# Patient Record
Sex: MALE | Race: WHITE | Employment: OTHER | URBAN - METROPOLITAN AREA
[De-identification: names, ages, dates, MRNs, and addresses within clinical notes are randomized per-mention and may not be internally consistent; named-entity substitution may affect disease eponyms.]

---

## 2017-01-07 ENCOUNTER — LAB ENCOUNTER (OUTPATIENT)
Dept: LAB | Facility: HOSPITAL | Age: 82
End: 2017-01-07
Attending: UROLOGY
Payer: MEDICARE

## 2017-01-07 DIAGNOSIS — R82.90 ABNORMAL URINALYSIS: ICD-10-CM

## 2017-01-07 LAB
BILIRUB UR QL STRIP.AUTO: NEGATIVE
CLARITY UR REFRACT.AUTO: CLEAR
COLOR UR AUTO: YELLOW
GLUCOSE UR STRIP.AUTO-MCNC: NEGATIVE MG/DL
KETONES UR STRIP.AUTO-MCNC: NEGATIVE MG/DL
NITRITE UR QL STRIP.AUTO: NEGATIVE
PH UR STRIP.AUTO: 7 [PH] (ref 4.5–8)
PROT UR STRIP.AUTO-MCNC: NEGATIVE MG/DL
SP GR UR STRIP.AUTO: 1.01 (ref 1–1.03)
UROBILINOGEN UR STRIP.AUTO-MCNC: 0.2 MG/DL

## 2017-01-07 PROCEDURE — 81001 URINALYSIS AUTO W/SCOPE: CPT

## 2017-01-30 ENCOUNTER — APPOINTMENT (OUTPATIENT)
Dept: LAB | Facility: HOSPITAL | Age: 82
End: 2017-01-30
Attending: UROLOGY
Payer: MEDICARE

## 2017-01-30 DIAGNOSIS — N40.2 PROSTATE NODULE: ICD-10-CM

## 2017-01-30 DIAGNOSIS — N13.8 HYPERTROPHY OF PROSTATE WITH URINARY OBSTRUCTION AND OTHER LOWER URINARY TRACT SYMPTOMS (LUTS): ICD-10-CM

## 2017-01-30 DIAGNOSIS — N40.1 HYPERTROPHY OF PROSTATE WITH URINARY OBSTRUCTION AND OTHER LOWER URINARY TRACT SYMPTOMS (LUTS): ICD-10-CM

## 2017-01-30 LAB — PSA SERPL-MCNC: 8.33 NG/ML (ref 0.01–4)

## 2017-01-30 PROCEDURE — 36415 COLL VENOUS BLD VENIPUNCTURE: CPT

## 2017-01-30 PROCEDURE — 84153 ASSAY OF PSA TOTAL: CPT

## 2017-03-13 PROBLEM — N40.0 BPH WITH ELEVATED PSA: Status: ACTIVE | Noted: 2017-03-13

## 2017-03-13 PROBLEM — R97.20 BPH WITH ELEVATED PSA: Status: ACTIVE | Noted: 2017-03-13

## 2017-08-24 ENCOUNTER — HOSPITAL ENCOUNTER (OUTPATIENT)
Dept: GENERAL RADIOLOGY | Facility: HOSPITAL | Age: 82
Discharge: HOME | End: 2017-08-24
Attending: FAMILY MEDICINE
Payer: MEDICARE

## 2017-08-24 DIAGNOSIS — M25.561 PAIN IN RIGHT KNEE: ICD-10-CM

## 2017-08-24 PROCEDURE — 73560 X-RAY EXAM OF KNEE 1 OR 2: CPT | Performed by: FAMILY MEDICINE

## 2017-08-24 PROCEDURE — 73502 X-RAY EXAM HIP UNI 2-3 VIEWS: CPT | Performed by: FAMILY MEDICINE

## 2017-11-16 ENCOUNTER — PRIOR ORIGINAL RECORDS (OUTPATIENT)
Dept: OTHER | Age: 82
End: 2017-11-16

## 2017-12-07 ENCOUNTER — HOSPITAL ENCOUNTER (OUTPATIENT)
Dept: GENERAL RADIOLOGY | Facility: HOSPITAL | Age: 82
Discharge: HOME | End: 2017-12-07
Attending: FAMILY MEDICINE
Payer: MEDICARE

## 2017-12-07 ENCOUNTER — PRIOR ORIGINAL RECORDS (OUTPATIENT)
Dept: OTHER | Age: 82
End: 2017-12-07

## 2017-12-07 DIAGNOSIS — M25.562 PAIN OF LEFT PATELLA: ICD-10-CM

## 2017-12-07 DIAGNOSIS — M25.562 LEFT KNEE PAIN: ICD-10-CM

## 2017-12-07 PROCEDURE — 73560 X-RAY EXAM OF KNEE 1 OR 2: CPT | Performed by: FAMILY MEDICINE

## 2018-01-03 LAB
ALBUMIN: 3.9 G/DL
ALKALINE PHOSPHATATE(ALK PHOS): 79 IU/L
BILIRUBIN TOTAL: 0.4 MG/DL
BUN: 19 MG/DL
CALCIUM: 9.1 MG/DL
CHLORIDE: 104 MEQ/L
CHOLESTEROL, TOTAL: 175 MG/DL
CREATININE, SERUM: 1.1 MG/DL
FREE T4: 1.49 MG/DL
GLUCOSE: 97 MG/DL
HDL CHOLESTEROL: 50 MG/DL
HEMATOCRIT: 37.4 %
HEMOGLOBIN A1C: 5.5 %
HEMOGLOBIN: 12.3 G/DL
LDL CHOLESTEROL: 108 MG/DL
PLATELETS: 223 K/UL
POTASSIUM, SERUM: 4.5 MEQ/L
PROTEIN, TOTAL: 6.3 G/DL
RED BLOOD COUNT: 3.9 X 10-6/U
SGOT (AST): 19 IU/L
SGPT (ALT): 15 IU/L
SODIUM: 142 MEQ/L
THYROID STIMULATING HORMONE: 0.42 MLU/L
TRIGLYCERIDES: 83 MG/DL
URIC ACID: 6.5 MG/DL
WHITE BLOOD COUNT: 3.4 X 10-3/U

## 2018-07-03 ENCOUNTER — APPOINTMENT (OUTPATIENT)
Dept: LAB | Facility: HOSPITAL | Age: 83
End: 2018-07-03
Attending: UROLOGY
Payer: MEDICARE

## 2018-07-03 DIAGNOSIS — R97.20 BPH WITH ELEVATED PSA: ICD-10-CM

## 2018-07-03 DIAGNOSIS — N40.0 BPH WITH ELEVATED PSA: ICD-10-CM

## 2018-07-03 LAB — PSA SERPL-MCNC: 3.89 NG/ML (ref 0.01–4)

## 2018-07-03 PROCEDURE — 84153 ASSAY OF PSA TOTAL: CPT

## 2018-07-03 PROCEDURE — 36415 COLL VENOUS BLD VENIPUNCTURE: CPT

## 2018-10-23 ENCOUNTER — PRIOR ORIGINAL RECORDS (OUTPATIENT)
Dept: OTHER | Age: 83
End: 2018-10-23

## 2018-10-23 ENCOUNTER — MYAURORA ACCOUNT LINK (OUTPATIENT)
Dept: OTHER | Age: 83
End: 2018-10-23

## 2018-10-26 ENCOUNTER — HOSPITAL ENCOUNTER (OUTPATIENT)
Dept: CARDIOLOGY CLINIC | Facility: HOSPITAL | Age: 83
Discharge: HOME | End: 2018-10-26
Attending: INTERNAL MEDICINE

## 2018-10-26 ENCOUNTER — MYAURORA ACCOUNT LINK (OUTPATIENT)
Dept: OTHER | Age: 83
End: 2018-10-26

## 2018-10-26 DIAGNOSIS — I65.23 BILATERAL CAROTID ARTERY STENOSIS: ICD-10-CM

## 2018-10-26 DIAGNOSIS — E78.00 PURE HYPERCHOLESTEROLEMIA: ICD-10-CM

## 2018-10-26 DIAGNOSIS — I25.10 CORONARY ARTERIOSCLEROSIS: ICD-10-CM

## 2018-11-05 ENCOUNTER — PRIOR ORIGINAL RECORDS (OUTPATIENT)
Dept: OTHER | Age: 83
End: 2018-11-05

## 2018-11-26 ENCOUNTER — PRIOR ORIGINAL RECORDS (OUTPATIENT)
Dept: OTHER | Age: 83
End: 2018-11-26

## 2018-12-11 ENCOUNTER — PRIOR ORIGINAL RECORDS (OUTPATIENT)
Dept: OTHER | Age: 83
End: 2018-12-11

## 2019-01-02 LAB
ALBUMIN: 3.6 G/DL
ALKALINE PHOSPHATATE(ALK PHOS): 79 IU/L
BILIRUBIN TOTAL: 0.5 MG/DL
BUN: 20 MG/DL
CALCIUM: 9 MG/DL
CHLORIDE: 105 MEQ/L
CHOLESTEROL, TOTAL: 166 MG/DL
CREATININE, SERUM: 1.1 MG/DL
GLUCOSE: 104 MG/DL
HDL CHOLESTEROL: 51 MG/DL
HEMATOCRIT: 38.6 %
HEMOGLOBIN A1C: 5.7 %
HEMOGLOBIN: 12.7 G/DL
LDL CHOLESTEROL: 101 MG/DL
PLATELETS: 163 K/UL
POTASSIUM, SERUM: 4.4 MEQ/L
PROTEIN, TOTAL: 6.1 G/DL
RED BLOOD COUNT: 4.1 X 10-6/U
SGOT (AST): 19 IU/L
SGPT (ALT): 16 IU/L
SODIUM: 141 MEQ/L
THYROID STIMULATING HORMONE: 0.1 MLU/L
TRIGLYCERIDES: 71 MG/DL
WHITE BLOOD COUNT: 3.1 X 10-3/U

## 2019-02-28 VITALS
WEIGHT: 171 LBS | BODY MASS INDEX: 25.33 KG/M2 | HEART RATE: 66 BPM | SYSTOLIC BLOOD PRESSURE: 122 MMHG | HEIGHT: 69 IN | DIASTOLIC BLOOD PRESSURE: 60 MMHG

## 2019-10-15 ENCOUNTER — HOSPITAL ENCOUNTER (EMERGENCY)
Facility: HOSPITAL | Age: 84
Discharge: HOME | End: 2019-10-15
Attending: EMERGENCY MEDICINE
Payer: MEDICARE

## 2019-10-15 ENCOUNTER — APPOINTMENT (OUTPATIENT)
Dept: ULTRASOUND IMAGING | Facility: HOSPITAL | Age: 84
End: 2019-10-15
Attending: EMERGENCY MEDICINE
Payer: MEDICARE

## 2019-10-15 ENCOUNTER — APPOINTMENT (OUTPATIENT)
Dept: GENERAL RADIOLOGY | Facility: HOSPITAL | Age: 84
End: 2019-10-15
Attending: EMERGENCY MEDICINE
Payer: MEDICARE

## 2019-10-15 VITALS
OXYGEN SATURATION: 96 % | RESPIRATION RATE: 18 BRPM | HEART RATE: 75 BPM | DIASTOLIC BLOOD PRESSURE: 61 MMHG | SYSTOLIC BLOOD PRESSURE: 121 MMHG | TEMPERATURE: 98 F

## 2019-10-15 DIAGNOSIS — M25.522 LEFT ELBOW PAIN: Primary | ICD-10-CM

## 2019-10-15 PROCEDURE — 99284 EMERGENCY DEPT VISIT MOD MDM: CPT

## 2019-10-15 PROCEDURE — 73060 X-RAY EXAM OF HUMERUS: CPT | Performed by: EMERGENCY MEDICINE

## 2019-10-15 PROCEDURE — 93971 EXTREMITY STUDY: CPT | Performed by: EMERGENCY MEDICINE

## 2019-10-15 PROCEDURE — 73080 X-RAY EXAM OF ELBOW: CPT | Performed by: EMERGENCY MEDICINE

## 2019-10-15 NOTE — ED PROVIDER NOTES
Patient Seen in: [facility] Emergency Department      History   Patient presents with:  Upper Extremity Injury (musculoskeletal)    Stated Complaint: L elbow/humerus pain, worse with movement    HPI    Patient is a 80year-old right-hand-dominant ma tenderness to palpation appreciated. There is no JVD. No meningeal signs or nuchal rigidity appreciated. No stridor. LUNGS: Clear to auscultation bilaterally with no wheeze. There is good equal air entry bilaterally. HEART: Regular rate and rhythm.  Carlos Hamilton Peter Velasquez MD on 10/15/2019 at 12:14     Approved by: Savana Del Real MD on 10/15/2019 at 12:15                MDM     Patient had x-rays done as noted above. Patient does have evidence of an elbow joint effusion. There is no fracture appreciated.   There is no

## 2019-10-15 NOTE — ED NOTES
Pt awake and alert, skin w/d,resps reg/unlabored. Pt appears comfortable on cart. Pt aware of plan of care.

## 2019-12-03 LAB
25(OH)D3+25(OH)D2 SERPL-MCNC: 42 NG/ML
ALBUMIN SERPL-MCNC: 4 G/DL
ALP SERPL-CCNC: 83 U/L
ALT SERPL-CCNC: 17 U/L
AST SERPL-CCNC: 24 U/L
BILIRUB SERPL-MCNC: 0.4 MG/DL
BUN SERPL-MCNC: 21 MG/DL
CALCIUM SERPL-MCNC: 9.5 MG/DL
CHLORIDE SERPL-SCNC: 107 MMOL/L
CHOLEST SERPL-MCNC: 176 MG/DL
CREAT SERPL-MCNC: 1 MG/DL
GLUCOSE SERPL-MCNC: 97 MG/DL
HBA1C MFR BLD: 5.7 %
HCT VFR BLD CALC: 39.5 %
HDLC SERPL-MCNC: 53 MG/DL
HGB BLD-MCNC: 12.7 G/DL
LDLC SERPL CALC-MCNC: 108 MG/DL
NONHDLC SERPL-MCNC: 123 MG/DL
PLATELET # BLD: 195 10*3/UL
POTASSIUM SERPL-SCNC: 3.9 MMOL/L
PROT SERPL-MCNC: 6.4 G/DL
RBC # BLD: 4 10*6/UL
SODIUM SERPL-SCNC: 143 MMOL/L
T4 FREE SERPL-MCNC: 1.71 NG/DL
TRIGL SERPL-MCNC: 73 MG/DL
TSH SERPL-ACNC: 0.05 M[IU]/L
URATE SERPL-MCNC: 5.6 MG/DL
WBC # BLD: 4 10*3/UL

## 2020-01-01 ENCOUNTER — EXTERNAL RECORD (OUTPATIENT)
Dept: HEALTH INFORMATION MANAGEMENT | Facility: OTHER | Age: 85
End: 2020-01-01

## 2020-03-10 ENCOUNTER — OFFICE VISIT (OUTPATIENT)
Dept: CARDIOLOGY | Age: 85
End: 2020-03-10

## 2020-03-10 VITALS
HEART RATE: 72 BPM | BODY MASS INDEX: 24.59 KG/M2 | HEIGHT: 69 IN | SYSTOLIC BLOOD PRESSURE: 104 MMHG | DIASTOLIC BLOOD PRESSURE: 62 MMHG | WEIGHT: 166 LBS

## 2020-03-10 DIAGNOSIS — I25.10 CORONARY ARTERY DISEASE INVOLVING NATIVE CORONARY ARTERY OF NATIVE HEART WITHOUT ANGINA PECTORIS: Primary | ICD-10-CM

## 2020-03-10 DIAGNOSIS — I25.10 CORONARY ARTERY CALCIFICATION SEEN ON CT SCAN: ICD-10-CM

## 2020-03-10 DIAGNOSIS — Z82.49 FAMILY HISTORY OF CARDIOVASCULAR DISEASE: ICD-10-CM

## 2020-03-10 DIAGNOSIS — E78.00 PURE HYPERCHOLESTEROLEMIA: ICD-10-CM

## 2020-03-10 PROCEDURE — 99214 OFFICE O/P EST MOD 30 MIN: CPT | Performed by: INTERNAL MEDICINE

## 2020-03-10 RX ORDER — IBUPROFEN 200 MG
TABLET ORAL EVERY 6 HOURS SCHEDULED
COMMUNITY
End: 2020-08-01 | Stop reason: ALTCHOICE

## 2020-03-10 RX ORDER — ACETAZOLAMIDE 125 MG/1
TABLET ORAL
COMMUNITY

## 2020-03-10 RX ORDER — LORAZEPAM 0.5 MG/1
TABLET ORAL
COMMUNITY

## 2020-03-10 RX ORDER — SENNOSIDES 8.6 MG
650 CAPSULE ORAL DAILY PRN
COMMUNITY

## 2020-03-10 SDOH — SOCIAL STABILITY: SOCIAL NETWORK: ARE YOU MARRIED, WIDOWED, DIVORCED, SEPARATED, NEVER MARRIED, OR LIVING WITH A PARTNER?: WIDOWED

## 2020-03-10 SDOH — HEALTH STABILITY: PHYSICAL HEALTH: ON AVERAGE, HOW MANY DAYS PER WEEK DO YOU ENGAGE IN MODERATE TO STRENUOUS EXERCISE (LIKE A BRISK WALK)?: 3 DAYS

## 2020-03-10 SDOH — HEALTH STABILITY: PHYSICAL HEALTH: ON AVERAGE, HOW MANY MINUTES DO YOU ENGAGE IN EXERCISE AT THIS LEVEL?: 50 MIN

## 2020-03-10 ASSESSMENT — PATIENT HEALTH QUESTIONNAIRE - PHQ9
1. LITTLE INTEREST OR PLEASURE IN DOING THINGS: NOT AT ALL
SUM OF ALL RESPONSES TO PHQ9 QUESTIONS 1 AND 2: 0
2. FEELING DOWN, DEPRESSED OR HOPELESS: NOT AT ALL
SUM OF ALL RESPONSES TO PHQ9 QUESTIONS 1 AND 2: 0

## 2020-05-22 ENCOUNTER — CLINICAL ABSTRACT (OUTPATIENT)
Dept: CARDIOLOGY | Age: 85
End: 2020-05-22

## 2020-05-24 ENCOUNTER — HOSPITAL ENCOUNTER (EMERGENCY)
Facility: HOSPITAL | Age: 85
Discharge: HOME | End: 2020-05-24
Attending: EMERGENCY MEDICINE
Payer: MEDICARE

## 2020-05-24 VITALS
HEART RATE: 70 BPM | RESPIRATION RATE: 18 BRPM | OXYGEN SATURATION: 97 % | SYSTOLIC BLOOD PRESSURE: 115 MMHG | BODY MASS INDEX: 25.43 KG/M2 | TEMPERATURE: 98 F | HEIGHT: 67 IN | WEIGHT: 162 LBS | DIASTOLIC BLOOD PRESSURE: 71 MMHG

## 2020-05-24 DIAGNOSIS — T14.8XXA MULTIPLE SKIN TEARS: Primary | ICD-10-CM

## 2020-05-24 PROCEDURE — 99281 EMR DPT VST MAYX REQ PHY/QHP: CPT

## 2020-05-25 NOTE — ED PROVIDER NOTES
Patient Seen in: [facility] Emergency Department      History   Patient presents with:  Laceration Abrasion    Stated Complaint: laceration left forearm    HPI    Patient is an 80-year-old male presents with injury to left forearm.   He got scraped a pallor  Neuro: No focal deficits. Extremities: Left forearm: Multiple areas of skin tears. ED Course   Labs Reviewed - No data to display       Couple skin tears to left forearm. .  Unable to suture given the thinness of the skin.   Suggested soap water

## 2020-08-07 ENCOUNTER — APPOINTMENT (OUTPATIENT)
Dept: CV DIAGNOSTICS | Facility: HOSPITAL | Age: 85
End: 2020-08-07
Attending: EMERGENCY MEDICINE
Payer: MEDICARE

## 2020-08-07 ENCOUNTER — APPOINTMENT (OUTPATIENT)
Dept: GENERAL RADIOLOGY | Facility: HOSPITAL | Age: 85
End: 2020-08-07
Attending: EMERGENCY MEDICINE
Payer: MEDICARE

## 2020-08-07 ENCOUNTER — HOSPITAL ENCOUNTER (OUTPATIENT)
Facility: HOSPITAL | Age: 85
Setting detail: OBSERVATION
Discharge: HOME | End: 2020-08-10
Attending: EMERGENCY MEDICINE | Admitting: HOSPITALIST
Payer: MEDICARE

## 2020-08-07 DIAGNOSIS — I50.9 ACUTE CONGESTIVE HEART FAILURE, UNSPECIFIED HEART FAILURE TYPE (HCC): ICD-10-CM

## 2020-08-07 DIAGNOSIS — D64.9 ANEMIA, UNSPECIFIED TYPE: ICD-10-CM

## 2020-08-07 DIAGNOSIS — R01.1 HEART MURMUR: ICD-10-CM

## 2020-08-07 DIAGNOSIS — D69.6 THROMBOCYTOPENIA (HCC): ICD-10-CM

## 2020-08-07 DIAGNOSIS — R07.9 ACUTE CHEST PAIN: Primary | ICD-10-CM

## 2020-08-07 LAB
ALBUMIN SERPL-MCNC: 3.2 G/DL (ref 3.4–5)
ALBUMIN/GLOB SERPL: 0.9 {RATIO} (ref 1–2)
ALP LIVER SERPL-CCNC: 77 U/L (ref 45–117)
ALT SERPL-CCNC: 19 U/L (ref 16–61)
ANION GAP SERPL CALC-SCNC: 6 MMOL/L (ref 0–18)
AST SERPL-CCNC: 15 U/L (ref 15–37)
ATRIAL RATE: 92 BPM
BASOPHILS # BLD AUTO: 0.02 X10(3) UL (ref 0–0.2)
BASOPHILS NFR BLD AUTO: 0.3 %
BILIRUB SERPL-MCNC: 0.7 MG/DL (ref 0.1–2)
BILIRUB UR QL STRIP.AUTO: NEGATIVE
BUN BLD-MCNC: 24 MG/DL (ref 7–18)
BUN/CREAT SERPL: 18.9 (ref 10–20)
CALCIUM BLD-MCNC: 9 MG/DL (ref 8.5–10.1)
CHLORIDE SERPL-SCNC: 109 MMOL/L (ref 98–112)
CO2 SERPL-SCNC: 23 MMOL/L (ref 21–32)
COLOR UR AUTO: YELLOW
CREAT BLD-MCNC: 1.27 MG/DL (ref 0.7–1.3)
D-DIMER: 0.62 UG/ML FEU (ref ?–0.87)
DEPRECATED RDW RBC AUTO: 44.2 FL (ref 35.1–46.3)
EOSINOPHIL # BLD AUTO: 0 X10(3) UL (ref 0–0.7)
EOSINOPHIL NFR BLD AUTO: 0 %
ERYTHROCYTE [DISTWIDTH] IN BLOOD BY AUTOMATED COUNT: 12.6 % (ref 11–15)
EST. AVERAGE GLUCOSE BLD GHB EST-MCNC: 117 MG/DL (ref 68–126)
GLOBULIN PLAS-MCNC: 3.5 G/DL (ref 2.8–4.4)
GLUCOSE BLD-MCNC: 197 MG/DL (ref 70–99)
GLUCOSE UR STRIP.AUTO-MCNC: NEGATIVE MG/DL
HBA1C MFR BLD HPLC: 5.7 % (ref ?–5.7)
HCT VFR BLD AUTO: 37.5 % (ref 39–53)
HGB BLD-MCNC: 12.3 G/DL (ref 13–17.5)
IMM GRANULOCYTES # BLD AUTO: 0.03 X10(3) UL (ref 0–1)
IMM GRANULOCYTES NFR BLD: 0.4 %
KETONES UR STRIP.AUTO-MCNC: NEGATIVE MG/DL
LYMPHOCYTES # BLD AUTO: 0.42 X10(3) UL (ref 1–4)
LYMPHOCYTES NFR BLD AUTO: 5.8 %
M PROTEIN MFR SERPL ELPH: 6.7 G/DL (ref 6.4–8.2)
MCH RBC QN AUTO: 31.2 PG (ref 26–34)
MCHC RBC AUTO-ENTMCNC: 32.8 G/DL (ref 31–37)
MCV RBC AUTO: 95.2 FL (ref 80–100)
MONOCYTES # BLD AUTO: 0.74 X10(3) UL (ref 0.1–1)
MONOCYTES NFR BLD AUTO: 10.3 %
NEUTROPHILS # BLD AUTO: 5.97 X10 (3) UL (ref 1.5–7.7)
NEUTROPHILS # BLD AUTO: 5.97 X10(3) UL (ref 1.5–7.7)
NEUTROPHILS NFR BLD AUTO: 83.2 %
NITRITE UR QL STRIP.AUTO: NEGATIVE
NT-PROBNP SERPL-MCNC: 1184 PG/ML (ref ?–450)
OSMOLALITY SERPL CALC.SUM OF ELEC: 296 MOSM/KG (ref 275–295)
P AXIS: 56 DEGREES
P-R INTERVAL: 232 MS
PH UR STRIP.AUTO: 5 [PH] (ref 4.5–8)
PLATELET # BLD AUTO: 145 10(3)UL (ref 150–450)
POTASSIUM SERPL-SCNC: 3.9 MMOL/L (ref 3.5–5.1)
PROT UR STRIP.AUTO-MCNC: NEGATIVE MG/DL
Q-T INTERVAL: 350 MS
QRS DURATION: 92 MS
QTC CALCULATION (BEZET): 432 MS
R AXIS: 4 DEGREES
RBC # BLD AUTO: 3.94 X10(6)UL (ref 3.8–5.8)
SARS-COV-2 RNA RESP QL NAA+PROBE: NOT DETECTED
SED RATE-ML: 32 MM/HR (ref 0–12)
SODIUM SERPL-SCNC: 138 MMOL/L (ref 136–145)
SP GR UR STRIP.AUTO: 1.01 (ref 1–1.03)
T AXIS: 36 DEGREES
TROPONIN I SERPL-MCNC: <0.045 NG/ML (ref ?–0.04)
UROBILINOGEN UR STRIP.AUTO-MCNC: <2 MG/DL
VENTRICULAR RATE: 92 BPM
WBC # BLD AUTO: 7.2 X10(3) UL (ref 4–11)
WBC #/AREA URNS AUTO: >50 /HPF

## 2020-08-07 PROCEDURE — 93306 TTE W/DOPPLER COMPLETE: CPT | Performed by: EMERGENCY MEDICINE

## 2020-08-07 PROCEDURE — 71045 X-RAY EXAM CHEST 1 VIEW: CPT | Performed by: EMERGENCY MEDICINE

## 2020-08-07 PROCEDURE — 99215 OFFICE O/P EST HI 40 MIN: CPT | Performed by: INTERNAL MEDICINE

## 2020-08-07 PROCEDURE — B24BZZ4 ULTRASONOGRAPHY OF HEART WITH AORTA, TRANSESOPHAGEAL: ICD-10-PCS | Performed by: INTERNAL MEDICINE

## 2020-08-07 PROCEDURE — 99220 INITIAL OBSERVATION CARE,LEVL III: CPT | Performed by: HOSPITALIST

## 2020-08-07 RX ORDER — IBUPROFEN 600 MG/1
600 TABLET ORAL EVERY 4 HOURS PRN
Status: DISCONTINUED | OUTPATIENT
Start: 2020-08-07 | End: 2020-08-10

## 2020-08-07 RX ORDER — BISACODYL 10 MG
10 SUPPOSITORY, RECTAL RECTAL
Status: DISCONTINUED | OUTPATIENT
Start: 2020-08-07 | End: 2020-08-10

## 2020-08-07 RX ORDER — IBUPROFEN 400 MG/1
400 TABLET ORAL EVERY 4 HOURS PRN
Status: DISCONTINUED | OUTPATIENT
Start: 2020-08-07 | End: 2020-08-10

## 2020-08-07 RX ORDER — LEVOTHYROXINE SODIUM 0.12 MG/1
125 TABLET ORAL DAILY
Status: DISCONTINUED | OUTPATIENT
Start: 2020-08-07 | End: 2020-08-10

## 2020-08-07 RX ORDER — DOCUSATE SODIUM 100 MG/1
100 CAPSULE, LIQUID FILLED ORAL 2 TIMES DAILY
Status: DISCONTINUED | OUTPATIENT
Start: 2020-08-07 | End: 2020-08-10

## 2020-08-07 RX ORDER — IBUPROFEN 200 MG
200 TABLET ORAL EVERY 4 HOURS PRN
Status: DISCONTINUED | OUTPATIENT
Start: 2020-08-07 | End: 2020-08-10

## 2020-08-07 RX ORDER — POLYETHYLENE GLYCOL 3350 17 G/17G
17 POWDER, FOR SOLUTION ORAL DAILY PRN
Status: DISCONTINUED | OUTPATIENT
Start: 2020-08-07 | End: 2020-08-10

## 2020-08-07 RX ORDER — FUROSEMIDE 10 MG/ML
40 INJECTION INTRAMUSCULAR; INTRAVENOUS ONCE
Status: COMPLETED | OUTPATIENT
Start: 2020-08-07 | End: 2020-08-07

## 2020-08-07 RX ORDER — ACETAMINOPHEN 325 MG/1
650 TABLET ORAL EVERY 6 HOURS PRN
Status: DISCONTINUED | OUTPATIENT
Start: 2020-08-07 | End: 2020-08-10

## 2020-08-07 RX ORDER — ASPIRIN 81 MG/1
81 TABLET, CHEWABLE ORAL DAILY
Status: DISCONTINUED | OUTPATIENT
Start: 2020-08-07 | End: 2020-08-10

## 2020-08-07 RX ORDER — FINASTERIDE 5 MG/1
5 TABLET, FILM COATED ORAL DAILY
Status: DISCONTINUED | OUTPATIENT
Start: 2020-08-07 | End: 2020-08-10

## 2020-08-07 RX ORDER — TAMSULOSIN HYDROCHLORIDE 0.4 MG/1
0.4 CAPSULE ORAL DAILY
Status: DISCONTINUED | OUTPATIENT
Start: 2020-08-07 | End: 2020-08-10

## 2020-08-07 RX ORDER — ASPIRIN 81 MG/1
162 TABLET, CHEWABLE ORAL ONCE
Status: COMPLETED | OUTPATIENT
Start: 2020-08-07 | End: 2020-08-07

## 2020-08-07 RX ORDER — SODIUM PHOSPHATE, DIBASIC AND SODIUM PHOSPHATE, MONOBASIC 7; 19 G/133ML; G/133ML
1 ENEMA RECTAL ONCE AS NEEDED
Status: DISCONTINUED | OUTPATIENT
Start: 2020-08-07 | End: 2020-08-10

## 2020-08-07 NOTE — CONSULTS
[facility] at [address]  Report of Consultation    Raissa Elmore Patient Status:  Emergency    1933 MRN [id number]   Location [address] [facility] Attending Shiloh Monroe MD   [facility] Day # 0 Max:99.8 °F (37.7 °C)    Wt Readings from Last 3 Encounters:  08/07/20 : 162 lb (73.5 kg)  05/24/20 : 162 lb (73.5 kg)  12/13/19 : 162 lb (73.5 kg)      Telemetry: sinus  General: Alert and oriented in no apparent distress. HEENT: No focal deficits.   Neck

## 2020-08-07 NOTE — PROGRESS NOTES
NURSING ADMISSION NOTE      Patient admitted via Wheelchair  Oriented to room. Safety precautions initiated. Bed in low position. Call light in reach. Patient oriented to room. Call light in reach. Tele monitor on, VS taken.  Consults aware of pro

## 2020-08-07 NOTE — ED INITIAL ASSESSMENT (HPI)
Patient to ED c/o 2 really bad nights.    + SOB, chest tightness, slight cough this morning. No known fever.

## 2020-08-07 NOTE — ED PROVIDER NOTES
Patient Seen in: [facility] Emergency Department      History   Patient presents with:  Dyspnea ALISON SOB    Stated Complaint: TL- Pain around ribs, SOB, slight cough    HPI    Patient is a non-smoker. He has a strong family history of heart disease. Alcohol use: No    Drug use: No             Review of Systems    Positive for stated complaint: TL- Pain around ribs, SOB, slight cough  Other systems are as noted in HPI. Constitutional and vital signs reviewed.       All other systems reviewed and negati Small (*)     Leukocyte Esterase Urine Large (*)     WBC Urine >50 (*)     RBC URINE 3-5 (*)     Bacteria Urine 3+ (*)     Amorphous Crystals Rare (*)     Mucous Urine 1+ (*)     All other components within normal limits   PRO BETA NATRIURETIC PEPTIDE - Ab the  30-degree CHATTERJEE projection. There was mild anterolateral hypokinesia,  overall well preserved left ventricular systolic function. The  estimated left ventricular ejection fraction was 50% to 55%.   3. The ascending aorta appeared extremely tortuous and D-dimer negative  BNP 1184  Sed rate 32  Rapid COVID negative    Chest x-ray  CONCLUSION:  Small bilateral pleural effusions and mild interstitial edema, a suggestive of fluid overload or CHF.       Patient treated with Lasix     I discussed case with dani

## 2020-08-07 NOTE — H&P
[facility]  History and Physical     Genesis Mack Patient Status:  Observation    1933 MRN [id number]   [facility] 8NE-A Attending eJrrod Caldera MD   Hosp Day # 0 PCP Kate Purvis     Chief Complaint: chest pain    His facility-administered medications on file prior to encounter. finasteride 5 MG Oral Tab, Take 1 tablet (5 mg total) by mouth daily. , Disp: 90 tablet, Rfl: 3  tamsulosin HCl 0.4 MG Oral Cap, TAKE 1 CAPSULE (0.4 MG TOTAL) BY MOUTH DAILY 1/2 HOUR AFTER Ernesto Casas affect.       Diagnostic Data:      Labs:  Recent Labs   Lab 08/07/20  1125   WBC 7.2   HGB 12.3*   MCV 95.2   .0*       Recent Labs   Lab 08/07/20  1122   *   BUN 24*   CREATSERUM 1.27   GFRAA 58*   GFRNAA 50*   CA 9.0   ALB 3.2*      K

## 2020-08-08 ENCOUNTER — APPOINTMENT (OUTPATIENT)
Dept: CV DIAGNOSTICS | Facility: HOSPITAL | Age: 85
End: 2020-08-08
Attending: NURSE PRACTITIONER
Payer: MEDICARE

## 2020-08-08 LAB
ANION GAP SERPL CALC-SCNC: 2 MMOL/L (ref 0–18)
BASOPHILS # BLD AUTO: 0.02 X10(3) UL (ref 0–0.2)
BASOPHILS NFR BLD AUTO: 0.3 %
BUN BLD-MCNC: 24 MG/DL (ref 7–18)
BUN/CREAT SERPL: 20.7 (ref 10–20)
CALCIUM BLD-MCNC: 9 MG/DL (ref 8.5–10.1)
CHLORIDE SERPL-SCNC: 108 MMOL/L (ref 98–112)
CO2 SERPL-SCNC: 27 MMOL/L (ref 21–32)
CREAT BLD-MCNC: 1.16 MG/DL (ref 0.7–1.3)
DEPRECATED RDW RBC AUTO: 44.8 FL (ref 35.1–46.3)
EOSINOPHIL # BLD AUTO: 0.01 X10(3) UL (ref 0–0.7)
EOSINOPHIL NFR BLD AUTO: 0.1 %
ERYTHROCYTE [DISTWIDTH] IN BLOOD BY AUTOMATED COUNT: 12.9 % (ref 11–15)
GLUCOSE BLD-MCNC: 135 MG/DL (ref 70–99)
HCT VFR BLD AUTO: 37 % (ref 39–53)
HGB BLD-MCNC: 12.4 G/DL (ref 13–17.5)
IMM GRANULOCYTES # BLD AUTO: 0.02 X10(3) UL (ref 0–1)
IMM GRANULOCYTES NFR BLD: 0.3 %
LYMPHOCYTES # BLD AUTO: 0.67 X10(3) UL (ref 1–4)
LYMPHOCYTES NFR BLD AUTO: 9.1 %
MCH RBC QN AUTO: 32 PG (ref 26–34)
MCHC RBC AUTO-ENTMCNC: 33.5 G/DL (ref 31–37)
MCV RBC AUTO: 95.4 FL (ref 80–100)
MONOCYTES # BLD AUTO: 0.82 X10(3) UL (ref 0.1–1)
MONOCYTES NFR BLD AUTO: 11.1 %
NEUTROPHILS # BLD AUTO: 5.86 X10 (3) UL (ref 1.5–7.7)
NEUTROPHILS # BLD AUTO: 5.86 X10(3) UL (ref 1.5–7.7)
NEUTROPHILS NFR BLD AUTO: 79.1 %
OSMOLALITY SERPL CALC.SUM OF ELEC: 290 MOSM/KG (ref 275–295)
PLATELET # BLD AUTO: 160 10(3)UL (ref 150–450)
POTASSIUM SERPL-SCNC: 4 MMOL/L (ref 3.5–5.1)
RBC # BLD AUTO: 3.88 X10(6)UL (ref 3.8–5.8)
SODIUM SERPL-SCNC: 137 MMOL/L (ref 136–145)
WBC # BLD AUTO: 7.4 X10(3) UL (ref 4–11)

## 2020-08-08 PROCEDURE — 93017 CV STRESS TEST TRACING ONLY: CPT | Performed by: NURSE PRACTITIONER

## 2020-08-08 PROCEDURE — 99214 OFFICE O/P EST MOD 30 MIN: CPT | Performed by: INTERNAL MEDICINE

## 2020-08-08 PROCEDURE — 93018 CV STRESS TEST I&R ONLY: CPT | Performed by: NURSE PRACTITIONER

## 2020-08-08 PROCEDURE — 78452 HT MUSCLE IMAGE SPECT MULT: CPT | Performed by: NURSE PRACTITIONER

## 2020-08-08 PROCEDURE — 99225 SUBSEQUENT OBSERVATION CARE: CPT | Performed by: HOSPITALIST

## 2020-08-08 NOTE — PROGRESS NOTES
[facility]  Cardiology Progress Note    Subjective:  No chest pain or shortness of breath. Patient requesting to have an exercise stress test as opposed to the chemical stress test. Very proactive.      Objective:  /54 (BP Location: Left arm)   P Exercise nuclear stress test pending for today. 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    · Mild CAD on previous [facility] (years ago)    Plan:  · Exercise nuclear stress test today    GENE Gardner  8/8/2020  7:27 AM  --

## 2020-08-08 NOTE — PLAN OF CARE
Problem: Patient/Family Goals  Goal: Patient/Family Long Term Goal  Description  Patient's Long Term Goal: D/c back home    Interventions:  -   - See additional Care Plan goals for specific interventions  Outcome: Progressing  Goal: Patient/Family Short barriers to discharge w/pt and caregiver  - Include patient/family/discharge partner in discharge planning  - Arrange for needed discharge resources and transportation as appropriate  - Identify discharge learning needs (meds, wound care, etc)  - Arrange f

## 2020-08-08 NOTE — PROGRESS NOTES
[facility]  Progress Note     Chuck Murphy Patient Status:  Observation    1933 MRN [id number]   [facility] 8NE-A Attending Demarcus De La O MD   Hosp Day # 0 PCP Santa Márquez     Chief Complaint: chest pain    S: Patient Daily   • Levothyroxine Sodium  125 mcg Oral Daily   • tamsulosin HCl  0.4 mg Oral Daily   • cefTRIAXone  1 g Intravenous Q24H   • docusate sodium  100 mg Oral BID       ASSESSMENT / PLAN:     1. Chest pain  1.  ECHO in ER showed rupture of chord to [facility]

## 2020-08-08 NOTE — PLAN OF CARE
Pt denies c/o pain, malaise, or cardiac symptoms. A&Ox4. Lungs clear bilaterally with equal expansion, on room air. Pt NSR on monitor with regular rate. Abdomen soft and non-tender with active bowel sounds in all four quadrants. Will continue to monitor. appropriate  - Identify discharge learning needs (meds, wound care, etc)  - Arrange for interpreters to assist at discharge as needed  - Consider post-discharge preferences of patient/family/discharge partner  - Complete POLST form as appropriate  - Assess

## 2020-08-09 PROCEDURE — 99214 OFFICE O/P EST MOD 30 MIN: CPT | Performed by: INTERNAL MEDICINE

## 2020-08-09 PROCEDURE — 99225 SUBSEQUENT OBSERVATION CARE: CPT | Performed by: HOSPITALIST

## 2020-08-09 RX ORDER — MAGNESIUM OXIDE 400 MG (241.3 MG MAGNESIUM) TABLET
2 TABLET NIGHTLY
Status: DISCONTINUED | OUTPATIENT
Start: 2020-08-09 | End: 2020-08-10

## 2020-08-09 RX ORDER — SODIUM CHLORIDE 9 MG/ML
INJECTION, SOLUTION INTRAVENOUS
Status: COMPLETED | OUTPATIENT
Start: 2020-08-10 | End: 2020-08-10

## 2020-08-09 NOTE — PROGRESS NOTES
[facility]  Cardiology Progress Note    Subjective:  No chest pain or shortness of breath.     Objective:  BP 98/63 (BP Location: Left arm)   Pulse 82   Temp 97.5 °F (36.4 °C) (Axillary)   Resp 18   Ht 5' 7\" (1.702 m)   Wt 163 lb 5.8 oz (74.1 kg)   Sp minutes 30 seconds with average exercise tolerance. CV exam, rrr, J3D2, loud systolic murmur at 4th intercostal space with radiation to apex. NST negative for ischemia.   Recommend DEVON tomorrow for further evaluation than discussion with primary cardiolog

## 2020-08-09 NOTE — PLAN OF CARE
Received patient at 0730. Alert and orientated x4. Tele Rhythm NSR. O2 saturation 95% on 1L. Breath sounds clear. No C/O chest pain, dizziness, or SOB. Pt voiding with no issues. Pt tolerating ambulation. Skin is dry and intact. DEVON tomorrow.  Bed is locked Include patient/family/discharge partner in discharge planning  - Arrange for needed discharge resources and transportation as appropriate  - Identify discharge learning needs (meds, wound care, etc)  - Arrange for interpreters to assist at discharge as ne

## 2020-08-09 NOTE — PROGRESS NOTES
[facility]  Progress Note     Durga Seals Patient Status:  Observation    1933 MRN [id number]   [facility] 8NE-A Attending Alberto Khalil MD   Hosp Day # 0 PCP Lelia Mendez     Chief Complaint: chest pain    S: Patient • Levothyroxine Sodium  125 mcg Oral Daily   • tamsulosin HCl  0.4 mg Oral Daily   • cefTRIAXone  1 g Intravenous Q24H   • docusate sodium  100 mg Oral BID       ASSESSMENT / PLAN:     1. Chest pain  1.  ECHO in ER showed rupture of chord to posterior tahmina

## 2020-08-09 NOTE — PLAN OF CARE
Assumed care of pt around 1930. Pt Aox4. Ra. Denies pain. NSR on tele. VSS. Murmur present. Plan for DEVON Monday. Pt updated on plan. Will continue to monitor. 0500: Pt spO2 80% on room air, 3L NC placed.      Problem: PAIN - ADULT  Goal: Verbalizes/disp needs (meds, wound care, etc)  - Arrange for interpreters to assist at discharge as needed  - Consider post-discharge preferences of patient/family/discharge partner  - Complete POLST form as appropriate  - Assess patient's ability to be responsible for ma

## 2020-08-10 ENCOUNTER — APPOINTMENT (OUTPATIENT)
Dept: CV DIAGNOSTICS | Facility: HOSPITAL | Age: 85
End: 2020-08-10
Attending: INTERNAL MEDICINE
Payer: MEDICARE

## 2020-08-10 VITALS
HEIGHT: 67 IN | BODY MASS INDEX: 25.6 KG/M2 | OXYGEN SATURATION: 92 % | TEMPERATURE: 97 F | HEART RATE: 85 BPM | DIASTOLIC BLOOD PRESSURE: 73 MMHG | WEIGHT: 163.13 LBS | RESPIRATION RATE: 16 BRPM | SYSTOLIC BLOOD PRESSURE: 102 MMHG

## 2020-08-10 PROCEDURE — 93320 DOPPLER ECHO COMPLETE: CPT | Performed by: INTERNAL MEDICINE

## 2020-08-10 PROCEDURE — 76376 3D RENDER W/INTRP POSTPROCES: CPT | Performed by: INTERNAL MEDICINE

## 2020-08-10 PROCEDURE — 99214 OFFICE O/P EST MOD 30 MIN: CPT | Performed by: INTERNAL MEDICINE

## 2020-08-10 PROCEDURE — 99217 OBSERVATION CARE DISCHARGE: CPT | Performed by: HOSPITALIST

## 2020-08-10 PROCEDURE — 93312 ECHO TRANSESOPHAGEAL: CPT | Performed by: INTERNAL MEDICINE

## 2020-08-10 PROCEDURE — 99152 MOD SED SAME PHYS/QHP 5/>YRS: CPT | Performed by: INTERNAL MEDICINE

## 2020-08-10 PROCEDURE — 93325 DOPPLER ECHO COLOR FLOW MAPG: CPT | Performed by: INTERNAL MEDICINE

## 2020-08-10 RX ORDER — MIDAZOLAM HYDROCHLORIDE 1 MG/ML
INJECTION INTRAMUSCULAR; INTRAVENOUS
Status: COMPLETED
Start: 2020-08-10 | End: 2020-08-10

## 2020-08-10 RX ORDER — CEPHALEXIN 500 MG/1
500 CAPSULE ORAL 4 TIMES DAILY
Qty: 12 CAPSULE | Refills: 0 | Status: ON HOLD | OUTPATIENT
Start: 2020-08-10 | End: 2020-08-20

## 2020-08-10 RX ORDER — TORSEMIDE 10 MG/1
10 TABLET ORAL DAILY
Qty: 30 TABLET | Refills: 3 | Status: SHIPPED | OUTPATIENT
Start: 2020-08-10

## 2020-08-10 NOTE — PLAN OF CARE
A/o x4, NSR on tele. 97% o2 on 1L NC. Denies any pain or SOB at this time. DEVON scheduled for morning, NPO after midnight. Pt to take melatonin this evening to help him sleep. Plan of care discussed with pt, verbalized understanding.  Call light within reach transportation as appropriate  - Identify discharge learning needs (meds, wound care, etc)  - Arrange for interpreters to assist at discharge as needed  - Consider post-discharge preferences of patient/family/discharge partner  - Complete POLST form as brennen

## 2020-08-10 NOTE — PROGRESS NOTES
Seen and examined. Reviewed with Dr. Rob Lemso and Dr. Rosa bernal.   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

## 2020-08-10 NOTE — PROCEDURES
Cardiology 3D Transesophageal Echo Note    PRE-PROCEDURE DIAGNOSIS:  Mitral regurgitation    PROCEDURE: Transesophageal Echocardiogram.    SEDATION:   Topical spray x 1  Versed: 2 mg  Fentanyl: 75 mcg  I personally supervised the intravenous administration atheromata or thrombus. · No pericardial effusion.     3-Dimensional Echocardiography performed for:  · The pre-operative planning of valve repair for multiple etiologies of mitral regurgitation;  · Assessment of mitral stenosis and in the accurate calcula

## 2020-08-10 NOTE — PLAN OF CARE
NURSING DISCHARGE NOTE    Discharged Home via Wheelchair. Accompanied by RN. Belongings Taken by patient/family. Patient denies pain, dizziness, and SOB. Cleared by all consults. Discharged to [employer] with all belongings.  Patient verbalizes

## 2020-08-10 NOTE — PROGRESS NOTES
S/P DEVON with myself and Dr. Wyatt Youssef, ar well, awake, alert and oriented.   Report called to RN, will transfer to [id number]

## 2020-08-10 NOTE — PROGRESS NOTES
08/10/20 1700   Mobility   O2 walk?  Yes   SPO2 on Room Air at Rest 94   SPO2 Ambulation on Room Air 87   SPO2 Ambulation on Oxygen 97   Ambulation oxygen flow (liters per minute) 1

## 2020-08-10 NOTE — PLAN OF CARE
Received patient at 0730. Alert and orientated x4. Tele Rhythm NSR. O2 saturation 92% on RA. Breath sounds clear. O2 walk completed. No C/O chest pain or dizziness. Pt voiding with no issues. Skin is dry and intact. IV rocephin.  DEOVN this AM. Bed is locked Include patient/family/discharge partner in discharge planning  - Arrange for needed discharge resources and transportation as appropriate  - Identify discharge learning needs (meds, wound care, etc)  - Arrange for interpreters to assist at discharge as ne

## 2020-08-11 ENCOUNTER — APPOINTMENT (OUTPATIENT)
Dept: GENERAL RADIOLOGY | Facility: HOSPITAL | Age: 85
DRG: 219 | End: 2020-08-11
Attending: EMERGENCY MEDICINE
Payer: MEDICARE

## 2020-08-11 ENCOUNTER — APPOINTMENT (OUTPATIENT)
Dept: CT IMAGING | Facility: HOSPITAL | Age: 85
DRG: 219 | End: 2020-08-11
Attending: EMERGENCY MEDICINE
Payer: MEDICARE

## 2020-08-11 ENCOUNTER — APPOINTMENT (OUTPATIENT)
Dept: CV DIAGNOSTICS | Facility: HOSPITAL | Age: 85
DRG: 219 | End: 2020-08-11
Attending: INTERNAL MEDICINE
Payer: MEDICARE

## 2020-08-11 ENCOUNTER — APPOINTMENT (OUTPATIENT)
Dept: ULTRASOUND IMAGING | Facility: HOSPITAL | Age: 85
DRG: 219 | End: 2020-08-11
Attending: EMERGENCY MEDICINE
Payer: MEDICARE

## 2020-08-11 ENCOUNTER — HOSPITAL ENCOUNTER (INPATIENT)
Facility: HOSPITAL | Age: 85
LOS: 10 days | Discharge: HOME HEALTH | DRG: 219 | End: 2020-08-21
Attending: EMERGENCY MEDICINE | Admitting: HOSPITALIST
Payer: MEDICARE

## 2020-08-11 DIAGNOSIS — I34.0 MITRAL VALVE INSUFFICIENCY, UNSPECIFIED ETIOLOGY: ICD-10-CM

## 2020-08-11 DIAGNOSIS — J18.9 PNEUMONIA OF BOTH LUNGS DUE TO INFECTIOUS ORGANISM, UNSPECIFIED PART OF LUNG: Primary | ICD-10-CM

## 2020-08-11 DIAGNOSIS — I26.99 ACUTE PULMONARY EMBOLISM WITHOUT ACUTE COR PULMONALE, UNSPECIFIED PULMONARY EMBOLISM TYPE (HCC): ICD-10-CM

## 2020-08-11 LAB
ALBUMIN SERPL-MCNC: 3 G/DL (ref 3.4–5)
ALBUMIN/GLOB SERPL: 0.8 {RATIO} (ref 1–2)
ALLENS TEST: POSITIVE
ALP LIVER SERPL-CCNC: 76 U/L (ref 45–117)
ALT SERPL-CCNC: 17 U/L (ref 16–61)
ANION GAP SERPL CALC-SCNC: 7 MMOL/L (ref 0–18)
ANION GAP SERPL CALC-SCNC: 8 MMOL/L (ref 0–18)
APTT PPP: 187.3 SECONDS (ref 25.4–36.1)
APTT PPP: 38.7 SECONDS (ref 25.4–36.1)
ARTERIAL BLD GAS O2 SATURATION: 87 % (ref 92–100)
ARTERIAL BLOOD GAS BASE EXCESS: -1.5 MMOL/L (ref ?–2)
ARTERIAL BLOOD GAS HCO3: 22.1 MEQ/L (ref 22–26)
ARTERIAL BLOOD GAS PCO2: 34 MM HG (ref 35–45)
ARTERIAL BLOOD GAS PH: 7.43 (ref 7.35–7.45)
ARTERIAL BLOOD GAS PO2: 54 MM HG (ref 80–105)
AST SERPL-CCNC: 19 U/L (ref 15–37)
BASOPHILS # BLD AUTO: 0.03 X10(3) UL (ref 0–0.2)
BASOPHILS NFR BLD AUTO: 0.5 %
BILIRUB SERPL-MCNC: 0.4 MG/DL (ref 0.1–2)
BUN BLD-MCNC: 20 MG/DL (ref 7–18)
BUN BLD-MCNC: 29 MG/DL (ref 7–18)
BUN/CREAT SERPL: 19.2 (ref 10–20)
BUN/CREAT SERPL: 22.3 (ref 10–20)
CALCIUM BLD-MCNC: 9.1 MG/DL (ref 8.5–10.1)
CALCIUM BLD-MCNC: 9.2 MG/DL (ref 8.5–10.1)
CALCULATED O2 SATURATION: 89 % (ref 92–100)
CARBOXYHEMOGLOBIN: 1.5 % SAT (ref 0–3)
CHLORIDE SERPL-SCNC: 106 MMOL/L (ref 98–112)
CHLORIDE SERPL-SCNC: 107 MMOL/L (ref 98–112)
CO2 SERPL-SCNC: 24 MMOL/L (ref 21–32)
CO2 SERPL-SCNC: 26 MMOL/L (ref 21–32)
CREAT BLD-MCNC: 1.04 MG/DL (ref 0.7–1.3)
CREAT BLD-MCNC: 1.3 MG/DL (ref 0.7–1.3)
D-DIMER: 4.84 UG/ML FEU (ref ?–0.87)
DEPRECATED RDW RBC AUTO: 43.6 FL (ref 35.1–46.3)
DEPRECATED RDW RBC AUTO: 43.8 FL (ref 35.1–46.3)
EOSINOPHIL # BLD AUTO: 0.18 X10(3) UL (ref 0–0.7)
EOSINOPHIL NFR BLD AUTO: 3.3 %
ERYTHROCYTE [DISTWIDTH] IN BLOOD BY AUTOMATED COUNT: 12.6 % (ref 11–15)
ERYTHROCYTE [DISTWIDTH] IN BLOOD BY AUTOMATED COUNT: 12.8 % (ref 11–15)
GLOBULIN PLAS-MCNC: 3.8 G/DL (ref 2.8–4.4)
GLUCOSE BLD-MCNC: 128 MG/DL (ref 70–99)
GLUCOSE BLD-MCNC: 138 MG/DL (ref 70–99)
HCT VFR BLD AUTO: 35.1 % (ref 39–53)
HCT VFR BLD AUTO: 36.9 % (ref 39–53)
HGB BLD-MCNC: 11.8 G/DL (ref 13–17.5)
HGB BLD-MCNC: 12.3 G/DL (ref 13–17.5)
IMM GRANULOCYTES # BLD AUTO: 0.05 X10(3) UL (ref 0–1)
IMM GRANULOCYTES NFR BLD: 0.9 %
INR BLD: 1.03 (ref 0.89–1.11)
IONIZED CALCIUM: 1.26 MMOL/L (ref 1.12–1.32)
LACTIC ACID ARTERIAL: <1.6 MMOL/L (ref 0.5–2)
LYMPHOCYTES # BLD AUTO: 1.11 X10(3) UL (ref 1–4)
LYMPHOCYTES NFR BLD AUTO: 20.2 %
M PROTEIN MFR SERPL ELPH: 6.8 G/DL (ref 6.4–8.2)
MCH RBC QN AUTO: 31.3 PG (ref 26–34)
MCH RBC QN AUTO: 31.6 PG (ref 26–34)
MCHC RBC AUTO-ENTMCNC: 33.3 G/DL (ref 31–37)
MCHC RBC AUTO-ENTMCNC: 33.6 G/DL (ref 31–37)
MCV RBC AUTO: 93.9 FL (ref 80–100)
MCV RBC AUTO: 93.9 FL (ref 80–100)
METHEMOGLOBIN: 0.6 % SAT (ref 0.4–1.5)
MONOCYTES # BLD AUTO: 0.81 X10(3) UL (ref 0.1–1)
MONOCYTES NFR BLD AUTO: 14.8 %
NEUTROPHILS # BLD AUTO: 3.31 X10 (3) UL (ref 1.5–7.7)
NEUTROPHILS # BLD AUTO: 3.31 X10(3) UL (ref 1.5–7.7)
NEUTROPHILS NFR BLD AUTO: 60.3 %
NT-PROBNP SERPL-MCNC: 1158 PG/ML (ref ?–450)
OSMOLALITY SERPL CALC.SUM OF ELEC: 293 MOSM/KG (ref 275–295)
OSMOLALITY SERPL CALC.SUM OF ELEC: 295 MOSM/KG (ref 275–295)
PATIENT TEMPERATURE: 98.6 F
PLATELET # BLD AUTO: 194 10(3)UL (ref 150–450)
PLATELET # BLD AUTO: 199 10(3)UL (ref 150–450)
POTASSIUM BLOOD GAS: 3.8 MMOL/L (ref 3.6–5.1)
POTASSIUM SERPL-SCNC: 3.5 MMOL/L (ref 3.5–5.1)
POTASSIUM SERPL-SCNC: 3.6 MMOL/L (ref 3.5–5.1)
PROCALCITONIN SERPL-MCNC: <0.05 NG/ML (ref ?–0.16)
PSA SERPL DL<=0.01 NG/ML-MCNC: 13.8 SECONDS (ref 12.4–14.6)
RBC # BLD AUTO: 3.74 X10(6)UL (ref 3.8–5.8)
RBC # BLD AUTO: 3.93 X10(6)UL (ref 3.8–5.8)
SARS-COV-2 RNA RESP QL NAA+PROBE: NOT DETECTED
SODIUM BLOOD GAS: 138 MMOL/L (ref 136–144)
SODIUM SERPL-SCNC: 139 MMOL/L (ref 136–145)
SODIUM SERPL-SCNC: 139 MMOL/L (ref 136–145)
TOTAL HEMOGLOBIN: 11.9 G/DL (ref 12.6–17.4)
WBC # BLD AUTO: 5.5 X10(3) UL (ref 4–11)
WBC # BLD AUTO: 6 X10(3) UL (ref 4–11)

## 2020-08-11 PROCEDURE — 93306 TTE W/DOPPLER COMPLETE: CPT | Performed by: INTERNAL MEDICINE

## 2020-08-11 PROCEDURE — 93970 EXTREMITY STUDY: CPT | Performed by: EMERGENCY MEDICINE

## 2020-08-11 PROCEDURE — 99223 1ST HOSP IP/OBS HIGH 75: CPT | Performed by: HOSPITALIST

## 2020-08-11 PROCEDURE — 99223 1ST HOSP IP/OBS HIGH 75: CPT | Performed by: INTERNAL MEDICINE

## 2020-08-11 PROCEDURE — 71275 CT ANGIOGRAPHY CHEST: CPT | Performed by: EMERGENCY MEDICINE

## 2020-08-11 PROCEDURE — 71045 X-RAY EXAM CHEST 1 VIEW: CPT | Performed by: EMERGENCY MEDICINE

## 2020-08-11 RX ORDER — HEPARIN SODIUM 5000 [USP'U]/ML
80 INJECTION INTRAVENOUS; SUBCUTANEOUS ONCE
Status: COMPLETED | OUTPATIENT
Start: 2020-08-11 | End: 2020-08-11

## 2020-08-11 RX ORDER — LORAZEPAM 0.5 MG/1
0.5 TABLET ORAL EVERY 4 HOURS PRN
Status: DISCONTINUED | OUTPATIENT
Start: 2020-08-11 | End: 2020-08-21

## 2020-08-11 RX ORDER — HEPARIN SODIUM AND DEXTROSE 10000; 5 [USP'U]/100ML; G/100ML
18 INJECTION INTRAVENOUS ONCE
Status: COMPLETED | OUTPATIENT
Start: 2020-08-11 | End: 2020-08-11

## 2020-08-11 RX ORDER — FUROSEMIDE 10 MG/ML
40 INJECTION INTRAMUSCULAR; INTRAVENOUS ONCE
Status: DISCONTINUED | OUTPATIENT
Start: 2020-08-11 | End: 2020-08-11

## 2020-08-11 RX ORDER — HEPARIN SODIUM AND DEXTROSE 10000; 5 [USP'U]/100ML; G/100ML
INJECTION INTRAVENOUS CONTINUOUS
Status: DISCONTINUED | OUTPATIENT
Start: 2020-08-11 | End: 2020-08-14

## 2020-08-11 RX ORDER — ASPIRIN 81 MG/1
81 TABLET, CHEWABLE ORAL DAILY
Status: DISCONTINUED | OUTPATIENT
Start: 2020-08-11 | End: 2020-08-14

## 2020-08-11 RX ORDER — FINASTERIDE 5 MG/1
5 TABLET, FILM COATED ORAL DAILY
Status: DISCONTINUED | OUTPATIENT
Start: 2020-08-11 | End: 2020-08-15

## 2020-08-11 RX ORDER — LEVOTHYROXINE SODIUM 0.12 MG/1
125 TABLET ORAL
Status: DISCONTINUED | OUTPATIENT
Start: 2020-08-12 | End: 2020-08-15

## 2020-08-11 RX ORDER — FUROSEMIDE 10 MG/ML
20 INJECTION INTRAMUSCULAR; INTRAVENOUS ONCE
Status: COMPLETED | OUTPATIENT
Start: 2020-08-11 | End: 2020-08-11

## 2020-08-11 RX ORDER — TAMSULOSIN HYDROCHLORIDE 0.4 MG/1
0.4 CAPSULE ORAL DAILY
Status: DISCONTINUED | OUTPATIENT
Start: 2020-08-11 | End: 2020-08-15

## 2020-08-11 RX ORDER — LEVOTHYROXINE SODIUM 0.12 MG/1
125 TABLET ORAL
Status: DISCONTINUED | OUTPATIENT
Start: 2020-08-11 | End: 2020-08-11

## 2020-08-11 NOTE — H&P
[facility]  History and Physical     [facility] Patient Status:  Inpatient    1933 MRN [id number]   [facility] 5NW-A Attending Kimberly Brandon MD   Hosp Day # 0 PCP Misti Reed     Chief Complaint: SOB    History of drugs.     Family History:   Family History   Problem Relation Age of Onset   • Other (Other) Father         MENIERE DISEASE   • Diabetes Mother    • Heart Disease Mother    • Other (Other) Mother    • Cancer Sister         skin       Allergies: No Known Al No carotid bruits. Respiratory: Clear to auscultation bilaterally. No wheezes. No rhonchi. Cardiovascular: S1, S2. Regular rate and rhythm. Murmur+. Chest and Back: No tenderness or deformity. Abdomen: Soft, nontender, nondistended.   Positive bowel so RN    Jens Schulte MD  4/63/6340          **Certification      PHYSICIAN Certification of Need for Inpatient Hospitalization - Initial Certification    Patient will require inpatient services that will reasonably be expected to span two midnight's based

## 2020-08-11 NOTE — CONSULTS
[facility]    Inpatient Cardiology Consultation Note    Genesis Mack Patient Status:  Inpatient    1933 MRN [id number]   [facility] 5NW-A Attending Lisy Pulliam MD   Hosp Day # 0 PCP Kate Purvis       HPI: more from pulmonary edema from his flail mitral valve than a PE. we will obtain echo to evaluate for any RV strain    Severe MR:  -Patient flail posterior leaflet  -Chest imaging demonstrates pulmonary edema and patient endorses orthopnea  -We will start w as needed. Ascorbic Acid (VITAMIN C OR), Take 1 tablet by mouth daily. finasteride 5 MG Oral Tab, Take 1 tablet (5 mg total) by mouth daily.   tamsulosin HCl 0.4 MG Oral Cap, TAKE 1 CAPSULE (0.4 MG TOTAL) BY MOUTH DAILY 1/2 HOUR AFTER DINNER  Levothyrox

## 2020-08-11 NOTE — ED INITIAL ASSESSMENT (HPI)
Pt reports that he was discharged from the hospital yesterday, states that he was admitted on Friday. Patient reports that the doctor wanted to discharge him with oxygen but pt stated he did not think he needed it.  Patient stated he woke up feeling short o

## 2020-08-11 NOTE — PROGRESS NOTES
NURSING ADMISSION NOTE      Patient admitted via Cart  Oriented to room. Safety precautions initiated. Bed in low position. Call light in reach. Admission navigator completed. Pt A&Ox4. 4L O2 via nasal cannula. RA baseline.  , Telemetry monito

## 2020-08-11 NOTE — ED PROVIDER NOTES
Patient Seen in: [facility] Emergency Department      History   Patient presents with:  Dyspnea ALISON SOB    Stated Complaint: ALISON O2 [de-identified]    HPI    59-year-old male who was admitted to the hospital last week and discharged 3 days ago.   He said that he reviewed. All other systems reviewed and negative except as noted above.     Physical Exam     ED Triage Vitals [08/11/20 0604]   /81   Pulse 87   Resp 23   Temp 98.3 °F (36.8 °C)   Temp src Temporal   SpO2 96 %   O2 Device Nasal cannula       Cu for the following components:    PTT 38.7 (*)     All other components within normal limits   CBC W/ DIFFERENTIAL - Abnormal; Notable for the following components:    HGB 12.3 (*)     HCT 36.9 (*)     All other components within normal limits   PROTHROMBIN CONTRAST USED:  100cc of Omnipaque 350  FINDINGS:  VASCULATURE:  Small nonocclusive filling defects involve subsegmental branches of the right upper and lower lobe. THORACIC AORTA:  No aneurysm or visible dissection.   LUNGS:  Soft tissue attenuation adj (CPT=93970)  COMPARISON:  None. INDICATIONS:  Pain and swelling Difficulty breathing suspicion for DVT. TECHNIQUE:  Real time, grey scale, and duplex ultrasound was used to evaluate the lower extremity venous system.  B-mode two-dimensional images of the Ofelia Crockett MD on 8/11/2020 at 7:15 AM       Xr Chest Ap Portable  (cpt=71045)    Result Date: 8/7/2020  PROCEDURE:  XR CHEST AP PORTABLE  (CPT=71045)  TECHNIQUE:  AP chest radiograph was obtained.   COMPARISON:  [facility] , XR, CHEST PA   LATERAL, 2/13/20 due to infectious organism, unspecified part of lung  (primary encounter diagnosis)  Acute pulmonary embolism without acute cor pulmonale, unspecified pulmonary embolism type (HCC)  Mitral valve insufficiency, unspecified etiology    Disposition:  Admit  8

## 2020-08-12 ENCOUNTER — APPOINTMENT (OUTPATIENT)
Dept: ULTRASOUND IMAGING | Facility: HOSPITAL | Age: 85
DRG: 219 | End: 2020-08-12
Attending: THORACIC SURGERY (CARDIOTHORACIC VASCULAR SURGERY)
Payer: MEDICARE

## 2020-08-12 ENCOUNTER — APPOINTMENT (OUTPATIENT)
Dept: GENERAL RADIOLOGY | Facility: HOSPITAL | Age: 85
DRG: 219 | End: 2020-08-12
Attending: RADIOLOGY
Payer: MEDICARE

## 2020-08-12 LAB
APTT PPP: 108.6 SECONDS (ref 25.4–36.1)
APTT PPP: 37.1 SECONDS (ref 25.4–36.1)
APTT PPP: 62.9 SECONDS (ref 25.4–36.1)
PLATELET # BLD AUTO: 207 10(3)UL (ref 150–450)
SARS-COV-2 RNA RESP QL NAA+PROBE: NOT DETECTED

## 2020-08-12 PROCEDURE — 71045 X-RAY EXAM CHEST 1 VIEW: CPT | Performed by: RADIOLOGY

## 2020-08-12 PROCEDURE — 0W9B3ZZ DRAINAGE OF LEFT PLEURAL CAVITY, PERCUTANEOUS APPROACH: ICD-10-PCS | Performed by: RADIOLOGY

## 2020-08-12 PROCEDURE — 99233 SBSQ HOSP IP/OBS HIGH 50: CPT | Performed by: INTERNAL MEDICINE

## 2020-08-12 PROCEDURE — 32555 ASPIRATE PLEURA W/ IMAGING: CPT | Performed by: THORACIC SURGERY (CARDIOTHORACIC VASCULAR SURGERY)

## 2020-08-12 PROCEDURE — 99232 SBSQ HOSP IP/OBS MODERATE 35: CPT | Performed by: INTERNAL MEDICINE

## 2020-08-12 RX ORDER — AZITHROMYCIN 250 MG/1
500 TABLET, FILM COATED ORAL
Status: DISCONTINUED | OUTPATIENT
Start: 2020-08-12 | End: 2020-08-13

## 2020-08-12 RX ORDER — HEPARIN SODIUM 5000 [USP'U]/ML
30 INJECTION, SOLUTION INTRAVENOUS; SUBCUTANEOUS ONCE
Status: COMPLETED | OUTPATIENT
Start: 2020-08-12 | End: 2020-08-12

## 2020-08-12 NOTE — PROGRESS NOTES
[facility]  Progress Note     Renee Mora Patient Status:  Inpatient    1933 MRN [id number]   [facility] 5NW-A Attending Adrianna Vincent,    Hosp Day # 1 PCP Rola Schafer     Chief Complaint: dyspnea    S: Patient still Estimated Creatinine Clearance: 46.8 mL/min (based on SCr of 1.04 mg/dL). Recent Labs   Lab 08/11/20  0609   PTP 13.8   INR 1.03       Recent Labs   Lab 08/07/20  1122   TROP <0.045            Imaging: Imaging data reviewed in Epic.     Medications

## 2020-08-12 NOTE — PROGRESS NOTES
Patient seen by Dr Duncan Hou today. Plan for thoracentesis for bilateral pleural effusions today (left) and tomorrow (right). Formal consult note to follow.

## 2020-08-12 NOTE — PROGRESS NOTES
[facility]  Cardiology Progress Note    Subjective:  No chest pain, mild shortness of breath.     Objective:  BP 91/55 (BP Location: Left arm)   Pulse 81   Temp 98.5 °F (36.9 °C) (Oral)   Resp 18   Ht 5' 7\" (1.702 m)   Wt 167 lb 4.8 oz (75.9 kg)   SpO significant interval change. Assessment:  · SOB/dyspnea  · +PE  · Bilateral moderate sized pleural effusions. 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 Plan:  · Plan for bilateral thoracentesis per CV surgery.  Left side today and

## 2020-08-12 NOTE — PLAN OF CARE
Problem: pneumonia, PE, rule out COVID  Data: Patient alert and oriented overnight, on 3-4L O2 per NC to maintain saturation >90%. Patient up with assist, voiding per urinal, denies pain, vital signs stable, tele NSR.    Action: Due medications given, hepar

## 2020-08-12 NOTE — PLAN OF CARE
Problem: Patient/Family Goals  Goal: Patient/Family Long Term Goal  Description  Patient's Long Term Goal:   Discharge home with adequate resources   Interventions:  - Support from CM/ SW  - See additional Care Plan goals for specific interventions   Out PLANNING  Goal: Discharge to home or other facility with appropriate resources  Description  INTERVENTIONS:  - Identify barriers to discharge w/pt and caregiver  - Include patient/family/discharge partner in discharge planning  - Arrange for needed dischar including rhythm and repeat lab results as appropriate  - Fluid restriction as ordered  - Instruct patient on fluid and nutrition restrictions as appropriate  Outcome: Progressing

## 2020-08-12 NOTE — PLAN OF CARE
Pt AOx4. 4L O2. Baseline RA. . IS. Tele, NSR. Heparin gtt @ 10.5 ml/hr. Voids. Denies pain. SBA. Iv abx. Cardiac diet, tolerating well. Plan for thoracentesis of left lung today and thoracentesis of right lung tomorrow. Pt and POA aware of this plan.  he indicated by assessment.  - Educate pt/family on patient safety including physical limitations  - Instruct pt to call for assistance with activity based on assessment  - Modify environment to reduce risk of injury  - Provide assistive devices as appropriat anxiety  - Monitor for signs/symptoms of CO2 retention  Outcome: Progressing     Problem: METABOLIC/FLUID AND ELECTROLYTES - ADULT  Goal: Electrolytes maintained within normal limits  Description  INTERVENTIONS:  - Monitor labs and rhythm and assess patien

## 2020-08-13 ENCOUNTER — ANESTHESIA EVENT (OUTPATIENT)
Dept: CARDIAC SURGERY | Facility: HOSPITAL | Age: 85
DRG: 219 | End: 2020-08-13
Payer: MEDICARE

## 2020-08-13 LAB
ALBUMIN SERPL-MCNC: 2.7 G/DL (ref 3.4–5)
ALBUMIN/GLOB SERPL: 0.7 {RATIO} (ref 1–2)
ALP LIVER SERPL-CCNC: 76 U/L (ref 45–117)
ALT SERPL-CCNC: 19 U/L (ref 16–61)
ANION GAP SERPL CALC-SCNC: 5 MMOL/L (ref 0–18)
ANTIBODY SCREEN: NEGATIVE
APTT PPP: 118.9 SECONDS (ref 25.4–36.1)
APTT PPP: 123.9 SECONDS (ref 25.4–36.1)
APTT PPP: 99.2 SECONDS (ref 25.4–36.1)
AST SERPL-CCNC: 19 U/L (ref 15–37)
BILIRUB SERPL-MCNC: 0.6 MG/DL (ref 0.1–2)
BUN BLD-MCNC: 18 MG/DL (ref 7–18)
BUN/CREAT SERPL: 17.3 (ref 10–20)
CALCIUM BLD-MCNC: 9.1 MG/DL (ref 8.5–10.1)
CHLORIDE SERPL-SCNC: 106 MMOL/L (ref 98–112)
CO2 SERPL-SCNC: 26 MMOL/L (ref 21–32)
CREAT BLD-MCNC: 1.04 MG/DL (ref 0.7–1.3)
GLOBULIN PLAS-MCNC: 3.9 G/DL (ref 2.8–4.4)
GLUCOSE BLD-MCNC: 111 MG/DL (ref 70–99)
LDH SERPL L TO P-CCNC: 219 U/L
M PROTEIN MFR SERPL ELPH: 6.6 G/DL (ref 6.4–8.2)
OSMOLALITY SERPL CALC.SUM OF ELEC: 287 MOSM/KG (ref 275–295)
PLATELET # BLD AUTO: 195 10(3)UL (ref 150–450)
POTASSIUM SERPL-SCNC: 3.8 MMOL/L (ref 3.5–5.1)
RH BLOOD TYPE: POSITIVE
SODIUM SERPL-SCNC: 137 MMOL/L (ref 136–145)

## 2020-08-13 PROCEDURE — 99232 SBSQ HOSP IP/OBS MODERATE 35: CPT | Performed by: NURSE PRACTITIONER

## 2020-08-13 PROCEDURE — 99232 SBSQ HOSP IP/OBS MODERATE 35: CPT | Performed by: INTERNAL MEDICINE

## 2020-08-13 RX ORDER — SODIUM CHLORIDE 9 MG/ML
INJECTION, SOLUTION INTRAVENOUS
Status: COMPLETED | OUTPATIENT
Start: 2020-08-14 | End: 2020-08-14

## 2020-08-13 NOTE — PROGRESS NOTES
Patient transferred from [facility]. Assumed care of the patient of 13:15. Patient call light and phone within reach. Patient oriented to room. Bed in the low position and safety precautions in place. Will continue to monitor.

## 2020-08-13 NOTE — PROGRESS NOTES
Patient seen with Dr Nagi Noonan for surgery tomorrow for mitral valve replacement. All questions and concerns addressed with patient.

## 2020-08-13 NOTE — ANESTHESIA PREPROCEDURE EVALUATION
PRE-OP EVALUATION    Patient Name: Chata Lovell    Pre-op Diagnosis: Mitral valve stenosis    Procedure(s):  Mitral valve reepair/ replacement  IP 8608    Surgeon(s) and Role:     * Ivy Vicente MD - Primary    Pre-op vitals reviewed.   Temp: 96.2 °F (3 Vitamin (MULTIVITAMINS) Oral Tab, Take 1 tablet by mouth daily. , Disp: , Rfl:   VITAMIN D OR, Take 400 Units by mouth daily. , Disp: , Rfl:         Allergies: Patient has no known allergies. Anesthesia Evaluation    Patient summary reviewed.     Ane Violeta Vela   • OTHER SURGICAL HISTORY  5/16/12    THYROIDECTOMY   • THYROIDECTOMY     • TONSILLECTOMY       Social History    Tobacco Use      Smoking status: Never Smoker      Smokeless tobacco: Never Used    Alcohol use: Yes      Frequency: Monthly or

## 2020-08-13 NOTE — DISCHARGE SUMMARY
[facility]  DISCHARGE SUMMARY     Kandy An Patient Status:  Observation    1933 MRN [id number]   [facility] 8NE-A Attending No att. providers found   Hosp Day # 0 PCP Erich Flores     Date of Admission: 2020  Date Discharge:   · none    Consultants:  • Cardiology    Discharge Medication List:     Discharge Medications      START taking these medications      Instructions Prescription details   cephALEXin 500 MG Caps  Commonly known as:  KEFLEX      Take 1 capsule (5 specified. Appointments for Next 30 Days 8/13/2020 - 9/12/2020    None          Vital signs:       Physical Exam:    General: No acute distress. Respiratory: Clear to auscultation bilaterally. No wheezes. No rhonchi.   Cardiovascular: S1, S2. Regular rat

## 2020-08-13 NOTE — PLAN OF CARE
Pt is a/o x4, VSS. Maintaining o2 sats >94% on 3 liters. . Tele- NSR. Heparin GTT @ 9 ml/hr. BM this shift. Voids. Up with assistance. Cardiovascular Surgery wanted pt to transfer to telemetry floor prior to surgery tomorrow.  This RN gave report to Meri Progressing     Problem: SAFETY ADULT - FALL  Goal: Free from fall injury  Description  INTERVENTIONS:  - Assess pt frequently for physical needs  - Identify cognitive and physical deficits and behaviors that affect risk of falls.   - [place] fall precaut broncho-pulmonary hygiene including cough, deep breathe, Incentive Spirometry  - Assess the need for suctioning and perform as needed  - Assess and instruct to report SOB or any respiratory difficulty  - Respiratory Therapy support as indicated  - Manage/a

## 2020-08-13 NOTE — PLAN OF CARE
Problem: Patient/Family Goals  Goal: Patient/Family Long Term Goal  Description  Patient's Long Term Goal:   Discharge home with adequate resources   Interventions:  - Support from CM/ SW  - See additional Care Plan goals for specific interventions   Out OT/PT consult to assist with strengthening/mobility  - Encourage toileting schedule  Outcome: Progressing     Problem: DISCHARGE PLANNING  Goal: Discharge to home or other facility with appropriate resources  Description  INTERVENTIONS:  - Identify barrier symptoms of electrolyte imbalances  - Administer electrolyte replacement as ordered  - Monitor response to electrolyte replacements, including rhythm and repeat lab results as appropriate  - Fluid restriction as ordered  - Instruct patient on fluid and nut

## 2020-08-13 NOTE — PROGRESS NOTES
[facility]  Progress Note     Dionte Vidal Patient Status:  Inpatient    1933 MRN [id number]   [facility] 5NW-A Attending Bubba Jeong, DO   Hosp Day # 2 PCP Jassi Hyman     Chief Complaint: dyspnea    S: Patient   Feel CO2 23.0   < > 26.0 24.0 26.0   ALKPHO 77  --  76  --  76   AST 15  --  19  --  19   ALT 19  --  17  --  19   BILT 0.7  --  0.4  --  0.6   TP 6.7  --  6.8  --  6.6    < > = values in this interval not displayed. Estimated Creatinine Clearance: 46. 8 sent but likely transudative, plan to drain right in OR, will ask for fluid to be sent, orders in place  D/c Antibiotics  Transfer to CTU, NPO after midnight for planned MVR          Jose Juan Nixon, 08/13/20, 2:39 PM

## 2020-08-13 NOTE — PROGRESS NOTES
[facility]  Cardiology Progress Note    Berkley Ko Patient Status:  Inpatient    1933 MRN [id number]   [facility] 5NW-A Attending Gregor Hernandez, DO   Hosp Day # 2 PCP Misti Reed     Subjective:  Denies chest pain.  Shortn liters) and plans for the right today. Plan:   1. Patient is being moved to telemetry  2. Continue IV heparin  3. I/O, weights. May need diuretic.    4. Continue ASA    GENE Barlow  8/13/2020  11:12 AM    Addendum: Plans for Mitral valve repair

## 2020-08-14 ENCOUNTER — ANESTHESIA (OUTPATIENT)
Dept: CARDIAC SURGERY | Facility: HOSPITAL | Age: 85
DRG: 219 | End: 2020-08-14
Payer: MEDICARE

## 2020-08-14 ENCOUNTER — APPOINTMENT (OUTPATIENT)
Dept: GENERAL RADIOLOGY | Facility: HOSPITAL | Age: 85
DRG: 219 | End: 2020-08-14
Attending: NURSE PRACTITIONER
Payer: MEDICARE

## 2020-08-14 PROBLEM — Z95.2 S/P MVR (MITRAL VALVE REPLACEMENT): Status: ACTIVE | Noted: 2020-08-14

## 2020-08-14 LAB
ANION GAP SERPL CALC-SCNC: 4 MMOL/L (ref 0–18)
APTT PPP: 37.9 SECONDS (ref 25.4–36.1)
APTT PPP: 74.3 SECONDS (ref 25.4–36.1)
ARTERIAL BLD GAS O2 SATURATION: 96 % (ref 92–100)
ARTERIAL BLD GAS O2 SATURATION: 97 % (ref 92–100)
ARTERIAL BLOOD GAS BASE EXCESS: -0.7 MMOL/L (ref ?–2)
ARTERIAL BLOOD GAS BASE EXCESS: -1.3 MMOL/L (ref ?–2)
ARTERIAL BLOOD GAS HCO3: 22.4 MEQ/L (ref 22–26)
ARTERIAL BLOOD GAS HCO3: 22.8 MEQ/L (ref 22–26)
ARTERIAL BLOOD GAS PCO2: 32 MM HG (ref 35–45)
ARTERIAL BLOOD GAS PCO2: 33 MM HG (ref 35–45)
ARTERIAL BLOOD GAS PH: 7.45 (ref 7.35–7.45)
ARTERIAL BLOOD GAS PH: 7.46 (ref 7.35–7.45)
ARTERIAL BLOOD GAS PO2: 130 MM HG (ref 80–105)
ARTERIAL BLOOD GAS PO2: 91 MM HG (ref 80–105)
ATRIAL RATE: 63 BPM
ATRIAL RATE: 76 BPM
ATRIAL RATE: 88 BPM
BASOPHILS # BLD AUTO: 0.03 X10(3) UL (ref 0–0.2)
BASOPHILS NFR BLD AUTO: 0.4 %
BUN BLD-MCNC: 15 MG/DL (ref 7–18)
BUN BLD-MCNC: 19 MG/DL (ref 7–18)
BUN/CREAT SERPL: 21.1 (ref 10–20)
CALCIUM BLD-MCNC: 8.7 MG/DL (ref 8.5–10.1)
CALCIUM BLD-MCNC: 8.8 MG/DL (ref 8.5–10.1)
CALCULATED O2 SATURATION: 98 % (ref 92–100)
CALCULATED O2 SATURATION: 99 % (ref 92–100)
CARBOXYHEMOGLOBIN: 1.4 % SAT (ref 0–3)
CARBOXYHEMOGLOBIN: 1.5 % SAT (ref 0–3)
CHLORIDE SERPL-SCNC: 107 MMOL/L (ref 98–112)
CHLORIDE SERPL-SCNC: 109 MMOL/L (ref 98–112)
CO2 SERPL-SCNC: 26 MMOL/L (ref 21–32)
CO2 SERPL-SCNC: 26 MMOL/L (ref 21–32)
CREAT BLD-MCNC: 0.81 MG/DL (ref 0.7–1.3)
CREAT BLD-MCNC: 0.9 MG/DL (ref 0.7–1.3)
DEPRECATED RDW RBC AUTO: 43.7 FL (ref 35.1–46.3)
DEPRECATED RDW RBC AUTO: 45.7 FL (ref 35.1–46.3)
EOSINOPHIL # BLD AUTO: 0.08 X10(3) UL (ref 0–0.7)
EOSINOPHIL NFR BLD AUTO: 1.1 %
ERYTHROCYTE [DISTWIDTH] IN BLOOD BY AUTOMATED COUNT: 12.7 % (ref 11–15)
ERYTHROCYTE [DISTWIDTH] IN BLOOD BY AUTOMATED COUNT: 13.4 % (ref 11–15)
FIBRINOGEN: 377 MG/DL (ref 200–446)
FIO2: 40 %
FIO2: 50 %
GLUCOSE BLD-MCNC: 105 MG/DL (ref 70–99)
GLUCOSE BLD-MCNC: 114 MG/DL (ref 70–99)
GLUCOSE BLD-MCNC: 114 MG/DL (ref 70–99)
GLUCOSE BLD-MCNC: 115 MG/DL (ref 70–99)
GLUCOSE BLD-MCNC: 118 MG/DL (ref 70–99)
GLUCOSE BLD-MCNC: 118 MG/DL (ref 70–99)
GLUCOSE BLD-MCNC: 119 MG/DL (ref 70–99)
GLUCOSE BLD-MCNC: 122 MG/DL (ref 70–99)
GLUCOSE BLD-MCNC: 132 MG/DL (ref 70–99)
GLUCOSE BLD-MCNC: 136 MG/DL (ref 70–99)
GLUCOSE BLD-MCNC: 141 MG/DL (ref 70–99)
GLUCOSE BLD-MCNC: 150 MG/DL (ref 70–99)
HAV IGM SER QL: 2.6 MG/DL (ref 1.6–2.6)
HCT VFR BLD AUTO: 33.7 % (ref 39–53)
HCT VFR BLD AUTO: 35.4 % (ref 39–53)
HGB BLD-MCNC: 11.4 G/DL (ref 13–17.5)
HGB BLD-MCNC: 11.6 G/DL (ref 13–17.5)
IMM GRANULOCYTES # BLD AUTO: 0.11 X10(3) UL (ref 0–1)
IMM GRANULOCYTES NFR BLD: 1.5 %
INR BLD: 1.14 (ref 0.89–1.11)
INR BLD: 1.45 (ref 0.89–1.11)
INSPIRATORY TIME: 0.9 %
ISTAT ACTIVATED CLOTTING TIME: 142 SECONDS (ref 74–137)
ISTAT ACTIVATED CLOTTING TIME: 147 SECONDS (ref 74–137)
ISTAT BLOOD GAS BASE EXCESS: -2 MMOL/L (ref ?–30)
ISTAT BLOOD GAS BASE EXCESS: 1 MMOL/L (ref ?–30)
ISTAT BLOOD GAS HCO3: 22.9 MEQ/L (ref 22–26)
ISTAT BLOOD GAS HCO3: 24.7 MEQ/L (ref 22–26)
ISTAT BLOOD GAS O2 SATURATION: 98 % (ref 92–100)
ISTAT BLOOD GAS O2 SATURATION: 99 % (ref 92–100)
ISTAT BLOOD GAS PCO2: 35.5 MMHG (ref 35–45)
ISTAT BLOOD GAS PCO2: 36.3 MMHG (ref 35–45)
ISTAT BLOOD GAS PH: 7.41 (ref 7.35–7.45)
ISTAT BLOOD GAS PH: 7.45 (ref 7.35–7.45)
ISTAT BLOOD GAS PO2: 116 MMHG (ref 80–105)
ISTAT BLOOD GAS PO2: 96 MMHG (ref 80–105)
ISTAT BLOOD GAS TCO2: 24 MMOL/L (ref 22–32)
ISTAT BLOOD GAS TCO2: 26 MMOL/L (ref 22–32)
ISTAT HEMATOCRIT: 28 %
ISTAT HEMATOCRIT: 34 %
ISTAT IONIZED CALCIUM: 1.2 MMOL/L (ref 1.12–1.32)
ISTAT IONIZED CALCIUM: 1.29 MMOL/L (ref 1.12–1.32)
ISTAT POTASSIUM: 3.2 MMOL/L (ref 3.6–5.1)
ISTAT POTASSIUM: 3.5 MMOL/L (ref 3.6–5.1)
ISTAT SODIUM: 136 MMOL/L (ref 136–145)
ISTAT SODIUM: 142 MMOL/L (ref 136–145)
LYMPHOCYTES # BLD AUTO: 0.44 X10(3) UL (ref 1–4)
LYMPHOCYTES NFR BLD AUTO: 5.8 %
MCH RBC QN AUTO: 30.9 PG (ref 26–34)
MCH RBC QN AUTO: 31.1 PG (ref 26–34)
MCHC RBC AUTO-ENTMCNC: 32.8 G/DL (ref 31–37)
MCHC RBC AUTO-ENTMCNC: 33.8 G/DL (ref 31–37)
MCV RBC AUTO: 91.8 FL (ref 80–100)
MCV RBC AUTO: 94.1 FL (ref 80–100)
METHEMOGLOBIN: 0.8 % SAT (ref 0.4–1.5)
METHEMOGLOBIN: 0.8 % SAT (ref 0.4–1.5)
MONOCYTES # BLD AUTO: 0.4 X10(3) UL (ref 0.1–1)
MONOCYTES NFR BLD AUTO: 5.3 %
NEUTROPHILS # BLD AUTO: 6.52 X10 (3) UL (ref 1.5–7.7)
NEUTROPHILS # BLD AUTO: 6.52 X10(3) UL (ref 1.5–7.7)
NEUTROPHILS NFR BLD AUTO: 85.9 %
NITRIC OXIDE: 20 PPM
OSMOLALITY SERPL CALC.SUM OF ELEC: 287 MOSM/KG (ref 275–295)
P AXIS: 11 DEGREES
P AXIS: 17 DEGREES
P AXIS: 18 DEGREES
P-R INTERVAL: 194 MS
P-R INTERVAL: 196 MS
P-R INTERVAL: 222 MS
P/F RATIO: 235.7 MMHG
P/F RATIO: 282.5 MMHG
PATIENT TEMPERATURE: 95.2 F
PATIENT TEMPERATURE: 97.7 F
PEEP: 5 CM H2O
PEEP: 8 CM H2O
PLATELET # BLD AUTO: 125 10(3)UL (ref 150–450)
PLATELET # BLD AUTO: 214 10(3)UL (ref 150–450)
POTASSIUM SERPL-SCNC: 4.1 MMOL/L (ref 3.5–5.1)
POTASSIUM SERPL-SCNC: 4.3 MMOL/L (ref 3.5–5.1)
PSA SERPL DL<=0.01 NG/ML-MCNC: 15 SECONDS (ref 12.4–14.6)
PSA SERPL DL<=0.01 NG/ML-MCNC: 18 SECONDS (ref 12.4–14.6)
Q-T INTERVAL: 362 MS
Q-T INTERVAL: 386 MS
Q-T INTERVAL: 492 MS
QRS DURATION: 106 MS
QRS DURATION: 96 MS
QRS DURATION: 98 MS
QTC CALCULATION (BEZET): 434 MS
QTC CALCULATION (BEZET): 438 MS
QTC CALCULATION (BEZET): 503 MS
R AXIS: -1 DEGREES
R AXIS: 2 DEGREES
R AXIS: 4 DEGREES
RBC # BLD AUTO: 3.67 X10(6)UL (ref 3.8–5.8)
RBC # BLD AUTO: 3.76 X10(6)UL (ref 3.8–5.8)
SODIUM SERPL-SCNC: 137 MMOL/L (ref 136–145)
SODIUM SERPL-SCNC: 140 MMOL/L (ref 136–145)
T AXIS: 25 DEGREES
T AXIS: 27 DEGREES
T AXIS: 27 DEGREES
TIDAL VOLUME: 450 ML
TIDAL VOLUME: 450 ML
TOTAL HEMOGLOBIN: 12.5 G/DL (ref 12.6–17.4)
TOTAL HEMOGLOBIN: 12.6 G/DL (ref 12.6–17.4)
VENT RATE: 16 /MIN
VENT RATE: 16 /MIN
VENTRICULAR RATE: 63 BPM
VENTRICULAR RATE: 76 BPM
VENTRICULAR RATE: 88 BPM
WBC # BLD AUTO: 5.9 X10(3) UL (ref 4–11)
WBC # BLD AUTO: 7.6 X10(3) UL (ref 4–11)

## 2020-08-14 PROCEDURE — 30233R1 TRANSFUSION OF NONAUTOLOGOUS PLATELETS INTO PERIPHERAL VEIN, PERCUTANEOUS APPROACH: ICD-10-PCS | Performed by: THORACIC SURGERY (CARDIOTHORACIC VASCULAR SURGERY)

## 2020-08-14 PROCEDURE — 02RG08Z REPLACEMENT OF MITRAL VALVE WITH ZOOPLASTIC TISSUE, OPEN APPROACH: ICD-10-PCS | Performed by: THORACIC SURGERY (CARDIOTHORACIC VASCULAR SURGERY)

## 2020-08-14 PROCEDURE — S0028 INJECTION, FAMOTIDINE, 20 MG: HCPCS | Performed by: ANESTHESIOLOGY

## 2020-08-14 PROCEDURE — 99233 SBSQ HOSP IP/OBS HIGH 50: CPT | Performed by: INTERNAL MEDICINE

## 2020-08-14 PROCEDURE — 5A1221Z PERFORMANCE OF CARDIAC OUTPUT, CONTINUOUS: ICD-10-PCS | Performed by: THORACIC SURGERY (CARDIOTHORACIC VASCULAR SURGERY)

## 2020-08-14 PROCEDURE — 99232 SBSQ HOSP IP/OBS MODERATE 35: CPT | Performed by: INTERNAL MEDICINE

## 2020-08-14 PROCEDURE — 71045 X-RAY EXAM CHEST 1 VIEW: CPT | Performed by: NURSE PRACTITIONER

## 2020-08-14 PROCEDURE — 93312 ECHO TRANSESOPHAGEAL: CPT | Performed by: ANESTHESIOLOGY

## 2020-08-14 PROCEDURE — 76942 ECHO GUIDE FOR BIOPSY: CPT | Performed by: ANESTHESIOLOGY

## 2020-08-14 PROCEDURE — 30233L1 TRANSFUSION OF NONAUTOLOGOUS FRESH PLASMA INTO PERIPHERAL VEIN, PERCUTANEOUS APPROACH: ICD-10-PCS | Performed by: THORACIC SURGERY (CARDIOTHORACIC VASCULAR SURGERY)

## 2020-08-14 PROCEDURE — 36430 TRANSFUSION BLD/BLD COMPNT: CPT | Performed by: ANESTHESIOLOGY

## 2020-08-14 PROCEDURE — 30233N1 TRANSFUSION OF NONAUTOLOGOUS RED BLOOD CELLS INTO PERIPHERAL VEIN, PERCUTANEOUS APPROACH: ICD-10-PCS | Performed by: THORACIC SURGERY (CARDIOTHORACIC VASCULAR SURGERY)

## 2020-08-14 DEVICE — IMPLANTABLE DEVICE: Type: IMPLANTABLE DEVICE | Site: HEART | Status: FUNCTIONAL

## 2020-08-14 RX ORDER — VANCOMYCIN HYDROCHLORIDE 1 G/20ML
INJECTION, POWDER, LYOPHILIZED, FOR SOLUTION INTRAVENOUS AS NEEDED
Status: DISCONTINUED | OUTPATIENT
Start: 2020-08-14 | End: 2020-08-14 | Stop reason: SURG

## 2020-08-14 RX ORDER — FAMOTIDINE 10 MG/ML
INJECTION, SOLUTION INTRAVENOUS AS NEEDED
Status: DISCONTINUED | OUTPATIENT
Start: 2020-08-14 | End: 2020-08-14 | Stop reason: SURG

## 2020-08-14 RX ORDER — ONDANSETRON 2 MG/ML
INJECTION INTRAMUSCULAR; INTRAVENOUS AS NEEDED
Status: DISCONTINUED | OUTPATIENT
Start: 2020-08-14 | End: 2020-08-14 | Stop reason: SURG

## 2020-08-14 RX ORDER — HYDROCODONE BITARTRATE AND ACETAMINOPHEN 10; 325 MG/1; MG/1
1 TABLET ORAL EVERY 4 HOURS PRN
Status: DISCONTINUED | OUTPATIENT
Start: 2020-08-15 | End: 2020-08-21

## 2020-08-14 RX ORDER — MIDAZOLAM HYDROCHLORIDE 1 MG/ML
1 INJECTION INTRAMUSCULAR; INTRAVENOUS EVERY 30 MIN PRN
Status: DISCONTINUED | OUTPATIENT
Start: 2020-08-14 | End: 2020-08-15

## 2020-08-14 RX ORDER — MAGNESIUM SULFATE 1 G/100ML
1 INJECTION INTRAVENOUS AS NEEDED
Status: DISCONTINUED | OUTPATIENT
Start: 2020-08-14 | End: 2020-08-20

## 2020-08-14 RX ORDER — MORPHINE SULFATE 4 MG/ML
6 INJECTION, SOLUTION INTRAMUSCULAR; INTRAVENOUS EVERY 2 HOUR PRN
Status: DISCONTINUED | OUTPATIENT
Start: 2020-08-14 | End: 2020-08-20

## 2020-08-14 RX ORDER — LIDOCAINE HYDROCHLORIDE 10 MG/ML
INJECTION, SOLUTION EPIDURAL; INFILTRATION; INTRACAUDAL; PERINEURAL AS NEEDED
Status: DISCONTINUED | OUTPATIENT
Start: 2020-08-14 | End: 2020-08-14 | Stop reason: SURG

## 2020-08-14 RX ORDER — METHYLPREDNISOLONE SODIUM SUCCINATE 500 MG/1
INJECTION, POWDER, FOR SOLUTION INTRAMUSCULAR; INTRAVENOUS AS NEEDED
Status: DISCONTINUED | OUTPATIENT
Start: 2020-08-14 | End: 2020-08-14 | Stop reason: SURG

## 2020-08-14 RX ORDER — DEXMEDETOMIDINE HYDROCHLORIDE 4 UG/ML
INJECTION, SOLUTION INTRAVENOUS CONTINUOUS
Status: DISCONTINUED | OUTPATIENT
Start: 2020-08-14 | End: 2020-08-15

## 2020-08-14 RX ORDER — CEFAZOLIN SODIUM/WATER 2 G/20 ML
SYRINGE (ML) INTRAVENOUS AS NEEDED
Status: DISCONTINUED | OUTPATIENT
Start: 2020-08-14 | End: 2020-08-14 | Stop reason: SURG

## 2020-08-14 RX ORDER — POTASSIUM CHLORIDE 14.9 MG/ML
20 INJECTION INTRAVENOUS AS NEEDED
Status: DISCONTINUED | OUTPATIENT
Start: 2020-08-14 | End: 2020-08-20

## 2020-08-14 RX ORDER — DEXTROSE AND SODIUM CHLORIDE 5; .45 G/100ML; G/100ML
INJECTION, SOLUTION INTRAVENOUS CONTINUOUS
Status: DISCONTINUED | OUTPATIENT
Start: 2020-08-14 | End: 2020-08-15

## 2020-08-14 RX ORDER — MORPHINE SULFATE 4 MG/ML
4 INJECTION, SOLUTION INTRAMUSCULAR; INTRAVENOUS EVERY 2 HOUR PRN
Status: DISCONTINUED | OUTPATIENT
Start: 2020-08-14 | End: 2020-08-20

## 2020-08-14 RX ORDER — CHLORHEXIDINE GLUCONATE 0.12 MG/ML
15 RINSE ORAL
Status: DISCONTINUED | OUTPATIENT
Start: 2020-08-14 | End: 2020-08-15

## 2020-08-14 RX ORDER — POTASSIUM CHLORIDE 29.8 MG/ML
40 INJECTION INTRAVENOUS AS NEEDED
Status: DISCONTINUED | OUTPATIENT
Start: 2020-08-14 | End: 2020-08-20

## 2020-08-14 RX ORDER — ONDANSETRON 2 MG/ML
4 INJECTION INTRAMUSCULAR; INTRAVENOUS EVERY 6 HOURS PRN
Status: DISCONTINUED | OUTPATIENT
Start: 2020-08-14 | End: 2020-08-21

## 2020-08-14 RX ORDER — PROTAMINE SULFATE 10 MG/ML
INJECTION, SOLUTION INTRAVENOUS AS NEEDED
Status: DISCONTINUED | OUTPATIENT
Start: 2020-08-14 | End: 2020-08-14 | Stop reason: SURG

## 2020-08-14 RX ORDER — DOBUTAMINE HYDROCHLORIDE 200 MG/100ML
INJECTION INTRAVENOUS CONTINUOUS PRN
Status: DISCONTINUED | OUTPATIENT
Start: 2020-08-14 | End: 2020-08-20

## 2020-08-14 RX ORDER — MAGNESIUM SULFATE HEPTAHYDRATE 40 MG/ML
2 INJECTION, SOLUTION INTRAVENOUS AS NEEDED
Status: DISCONTINUED | OUTPATIENT
Start: 2020-08-14 | End: 2020-08-20

## 2020-08-14 RX ORDER — SUFENTANIL CITRATE 50 UG/ML
INJECTION EPIDURAL; INTRAVENOUS AS NEEDED
Status: DISCONTINUED | OUTPATIENT
Start: 2020-08-14 | End: 2020-08-14 | Stop reason: SURG

## 2020-08-14 RX ORDER — ALBUTEROL SULFATE 90 UG/1
AEROSOL, METERED RESPIRATORY (INHALATION) AS NEEDED
Status: DISCONTINUED | OUTPATIENT
Start: 2020-08-14 | End: 2020-08-14 | Stop reason: SURG

## 2020-08-14 RX ORDER — BACITRACIN 50000 [USP'U]/1
INJECTION, POWDER, LYOPHILIZED, FOR SOLUTION INTRAMUSCULAR AS NEEDED
Status: DISCONTINUED | OUTPATIENT
Start: 2020-08-14 | End: 2020-08-14 | Stop reason: HOSPADM

## 2020-08-14 RX ORDER — MORPHINE SULFATE 4 MG/ML
2 INJECTION, SOLUTION INTRAMUSCULAR; INTRAVENOUS EVERY 2 HOUR PRN
Status: DISCONTINUED | OUTPATIENT
Start: 2020-08-14 | End: 2020-08-20

## 2020-08-14 RX ORDER — MIDAZOLAM HYDROCHLORIDE 1 MG/ML
INJECTION INTRAMUSCULAR; INTRAVENOUS AS NEEDED
Status: DISCONTINUED | OUTPATIENT
Start: 2020-08-14 | End: 2020-08-14 | Stop reason: SURG

## 2020-08-14 RX ORDER — PHENYLEPHRINE HCL 10 MG/ML
VIAL (ML) INJECTION AS NEEDED
Status: DISCONTINUED | OUTPATIENT
Start: 2020-08-14 | End: 2020-08-14 | Stop reason: SURG

## 2020-08-14 RX ORDER — CEFAZOLIN SODIUM/WATER 2 G/20 ML
2 SYRINGE (ML) INTRAVENOUS EVERY 8 HOURS
Status: COMPLETED | OUTPATIENT
Start: 2020-08-14 | End: 2020-08-16

## 2020-08-14 RX ORDER — ALBUMIN, HUMAN INJ 5% 5 %
250 SOLUTION INTRAVENOUS ONCE AS NEEDED
Status: COMPLETED | OUTPATIENT
Start: 2020-08-14 | End: 2020-08-15

## 2020-08-14 RX ORDER — BISACODYL 10 MG
10 SUPPOSITORY, RECTAL RECTAL
Status: DISCONTINUED | OUTPATIENT
Start: 2020-08-14 | End: 2020-08-21

## 2020-08-14 RX ORDER — MELATONIN
3 NIGHTLY PRN
Status: DISCONTINUED | OUTPATIENT
Start: 2020-08-14 | End: 2020-08-21

## 2020-08-14 RX ORDER — ASPIRIN 300 MG
300 SUPPOSITORY, RECTAL RECTAL DAILY
Status: DISCONTINUED | OUTPATIENT
Start: 2020-08-15 | End: 2020-08-21

## 2020-08-14 RX ORDER — ASPIRIN 325 MG
325 TABLET, DELAYED RELEASE (ENTERIC COATED) ORAL DAILY
Status: DISCONTINUED | OUTPATIENT
Start: 2020-08-15 | End: 2020-08-21

## 2020-08-14 RX ORDER — ACETAMINOPHEN 10 MG/ML
INJECTION, SOLUTION INTRAVENOUS AS NEEDED
Status: DISCONTINUED | OUTPATIENT
Start: 2020-08-14 | End: 2020-08-14 | Stop reason: SURG

## 2020-08-14 RX ORDER — FAMOTIDINE 10 MG/ML
20 INJECTION, SOLUTION INTRAVENOUS 2 TIMES DAILY
Status: DISCONTINUED | OUTPATIENT
Start: 2020-08-14 | End: 2020-08-21

## 2020-08-14 RX ORDER — SUFENTANIL CITRATE 50 UG/ML
INJECTION EPIDURAL; INTRAVENOUS AS NEEDED
Status: DISCONTINUED | OUTPATIENT
Start: 2020-08-14 | End: 2020-08-14

## 2020-08-14 RX ORDER — SODIUM PHOSPHATE, DIBASIC AND SODIUM PHOSPHATE, MONOBASIC 7; 19 G/133ML; G/133ML
1 ENEMA RECTAL ONCE AS NEEDED
Status: DISCONTINUED | OUTPATIENT
Start: 2020-08-14 | End: 2020-08-21

## 2020-08-14 RX ORDER — DEXMEDETOMIDINE HYDROCHLORIDE 4 UG/ML
INJECTION, SOLUTION INTRAVENOUS CONTINUOUS PRN
Status: DISCONTINUED | OUTPATIENT
Start: 2020-08-14 | End: 2020-08-14 | Stop reason: SURG

## 2020-08-14 RX ORDER — DOCUSATE SODIUM 100 MG/1
100 CAPSULE, LIQUID FILLED ORAL 2 TIMES DAILY
Status: DISCONTINUED | OUTPATIENT
Start: 2020-08-14 | End: 2020-08-21

## 2020-08-14 RX ORDER — HEPARIN SODIUM 1000 [USP'U]/ML
INJECTION, SOLUTION INTRAVENOUS; SUBCUTANEOUS AS NEEDED
Status: DISCONTINUED | OUTPATIENT
Start: 2020-08-14 | End: 2020-08-14 | Stop reason: SURG

## 2020-08-14 RX ORDER — HEPARIN SODIUM 5000 [USP'U]/ML
INJECTION, SOLUTION INTRAVENOUS; SUBCUTANEOUS
Status: DISCONTINUED
Start: 2020-08-14 | End: 2020-08-14 | Stop reason: WASHOUT

## 2020-08-14 RX ORDER — DOBUTAMINE HYDROCHLORIDE 200 MG/100ML
INJECTION INTRAVENOUS CONTINUOUS PRN
Status: DISCONTINUED | OUTPATIENT
Start: 2020-08-14 | End: 2020-08-14 | Stop reason: SURG

## 2020-08-14 RX ORDER — PHYTONADIONE 10 MG/ML
INJECTION, EMULSION INTRAMUSCULAR; INTRAVENOUS; SUBCUTANEOUS AS NEEDED
Status: DISCONTINUED | OUTPATIENT
Start: 2020-08-14 | End: 2020-08-14 | Stop reason: SURG

## 2020-08-14 RX ORDER — DIPHENHYDRAMINE HYDROCHLORIDE 50 MG/ML
INJECTION INTRAMUSCULAR; INTRAVENOUS AS NEEDED
Status: DISCONTINUED | OUTPATIENT
Start: 2020-08-14 | End: 2020-08-14 | Stop reason: SURG

## 2020-08-14 RX ORDER — FAMOTIDINE 20 MG/1
20 TABLET ORAL 2 TIMES DAILY
Status: DISCONTINUED | OUTPATIENT
Start: 2020-08-14 | End: 2020-08-21

## 2020-08-14 RX ORDER — SODIUM CHLORIDE 9 MG/ML
INJECTION, SOLUTION INTRAVENOUS CONTINUOUS
Status: DISCONTINUED | OUTPATIENT
Start: 2020-08-14 | End: 2020-08-20

## 2020-08-14 RX ORDER — ROCURONIUM BROMIDE 10 MG/ML
INJECTION, SOLUTION INTRAVENOUS AS NEEDED
Status: DISCONTINUED | OUTPATIENT
Start: 2020-08-14 | End: 2020-08-14 | Stop reason: SURG

## 2020-08-14 RX ORDER — ACETAMINOPHEN 10 MG/ML
1000 INJECTION, SOLUTION INTRAVENOUS EVERY 8 HOURS
Status: DISCONTINUED | OUTPATIENT
Start: 2020-08-14 | End: 2020-08-15

## 2020-08-14 RX ORDER — HYDROCODONE BITARTRATE AND ACETAMINOPHEN 10; 325 MG/1; MG/1
2 TABLET ORAL EVERY 4 HOURS PRN
Status: DISCONTINUED | OUTPATIENT
Start: 2020-08-15 | End: 2020-08-21

## 2020-08-14 RX ORDER — NITROGLYCERIN 20 MG/100ML
INJECTION INTRAVENOUS CONTINUOUS PRN
Status: DISCONTINUED | OUTPATIENT
Start: 2020-08-14 | End: 2020-08-20

## 2020-08-14 RX ORDER — DEXTROSE MONOHYDRATE 25 G/50ML
50 INJECTION, SOLUTION INTRAVENOUS
Status: DISCONTINUED | OUTPATIENT
Start: 2020-08-14 | End: 2020-08-21

## 2020-08-14 RX ORDER — POLYETHYLENE GLYCOL 3350 17 G/17G
17 POWDER, FOR SOLUTION ORAL DAILY PRN
Status: DISCONTINUED | OUTPATIENT
Start: 2020-08-14 | End: 2020-08-21

## 2020-08-14 RX ADMIN — DOBUTAMINE HYDROCHLORIDE 5 MCG/KG/MIN: 200 INJECTION INTRAVENOUS at 10:20:00

## 2020-08-14 RX ADMIN — ALBUTEROL SULFATE 6 PUFF: 90 AEROSOL, METERED RESPIRATORY (INHALATION) at 12:57:00

## 2020-08-14 RX ADMIN — DEXMEDETOMIDINE HYDROCHLORIDE 0.6 MCG/KG/HR: 4 INJECTION, SOLUTION INTRAVENOUS at 11:29:00

## 2020-08-14 RX ADMIN — MIDAZOLAM HYDROCHLORIDE 4 MG: 1 INJECTION INTRAMUSCULAR; INTRAVENOUS at 12:52:00

## 2020-08-14 RX ADMIN — PHYTONADIONE 2.5 MG: 10 INJECTION, EMULSION INTRAMUSCULAR; INTRAVENOUS; SUBCUTANEOUS at 13:08:00

## 2020-08-14 RX ADMIN — PROTAMINE SULFATE 40 MG: 10 INJECTION, SOLUTION INTRAVENOUS at 13:09:00

## 2020-08-14 RX ADMIN — ROCURONIUM BROMIDE 50 MG: 10 INJECTION, SOLUTION INTRAVENOUS at 10:13:00

## 2020-08-14 RX ADMIN — ROCURONIUM BROMIDE 25 MG: 10 INJECTION, SOLUTION INTRAVENOUS at 12:52:00

## 2020-08-14 RX ADMIN — SODIUM CHLORIDE: 9 INJECTION, SOLUTION INTRAVENOUS at 10:06:00

## 2020-08-14 RX ADMIN — SUFENTANIL CITRATE 25 MCG: 50 INJECTION EPIDURAL; INTRAVENOUS at 11:29:00

## 2020-08-14 RX ADMIN — SUFENTANIL CITRATE 25 MCG: 50 INJECTION EPIDURAL; INTRAVENOUS at 12:40:00

## 2020-08-14 RX ADMIN — SUFENTANIL CITRATE 25 MCG: 50 INJECTION EPIDURAL; INTRAVENOUS at 11:00:00

## 2020-08-14 RX ADMIN — PHENYLEPHRINE HCL 100 MCG: 10 MG/ML VIAL (ML) INJECTION at 10:20:00

## 2020-08-14 RX ADMIN — ONDANSETRON 4 MG: 2 INJECTION INTRAMUSCULAR; INTRAVENOUS at 13:11:00

## 2020-08-14 RX ADMIN — PROTAMINE SULFATE 50 MG: 10 INJECTION, SOLUTION INTRAVENOUS at 13:13:00

## 2020-08-14 RX ADMIN — ACETAMINOPHEN 1000 MG: 10 INJECTION, SOLUTION INTRAVENOUS at 13:11:00

## 2020-08-14 RX ADMIN — METHYLPREDNISOLONE SODIUM SUCCINATE 500 MG: 500 INJECTION, POWDER, FOR SOLUTION INTRAMUSCULAR; INTRAVENOUS at 11:00:00

## 2020-08-14 RX ADMIN — SUFENTANIL CITRATE 25 MCG: 50 INJECTION EPIDURAL; INTRAVENOUS at 10:13:00

## 2020-08-14 RX ADMIN — CEFAZOLIN SODIUM/WATER 2 G: 2 G/20 ML SYRINGE (ML) INTRAVENOUS at 10:30:00

## 2020-08-14 RX ADMIN — PHYTONADIONE 2.5 MG: 10 INJECTION, EMULSION INTRAMUSCULAR; INTRAVENOUS; SUBCUTANEOUS at 13:22:00

## 2020-08-14 RX ADMIN — PHENYLEPHRINE HCL 100 MCG: 10 MG/ML VIAL (ML) INJECTION at 10:23:00

## 2020-08-14 RX ADMIN — PROTAMINE SULFATE 50 MG: 10 INJECTION, SOLUTION INTRAVENOUS at 13:25:00

## 2020-08-14 RX ADMIN — PROTAMINE SULFATE 50 MG: 10 INJECTION, SOLUTION INTRAVENOUS at 13:32:00

## 2020-08-14 RX ADMIN — DIPHENHYDRAMINE HYDROCHLORIDE 50 MG: 50 INJECTION INTRAMUSCULAR; INTRAVENOUS at 10:13:00

## 2020-08-14 RX ADMIN — CEFAZOLIN SODIUM/WATER 2 G: 2 G/20 ML SYRINGE (ML) INTRAVENOUS at 13:15:00

## 2020-08-14 RX ADMIN — FAMOTIDINE 20 MG: 10 INJECTION, SOLUTION INTRAVENOUS at 10:13:00

## 2020-08-14 RX ADMIN — DOBUTAMINE HYDROCHLORIDE 3 MCG/KG/MIN: 200 INJECTION INTRAVENOUS at 10:49:00

## 2020-08-14 RX ADMIN — PROTAMINE SULFATE 50 MG: 10 INJECTION, SOLUTION INTRAVENOUS at 13:50:00

## 2020-08-14 RX ADMIN — MIDAZOLAM HYDROCHLORIDE 4 MG: 1 INJECTION INTRAMUSCULAR; INTRAVENOUS at 11:29:00

## 2020-08-14 RX ADMIN — PROTAMINE SULFATE 10 MG: 10 INJECTION, SOLUTION INTRAVENOUS at 13:07:00

## 2020-08-14 RX ADMIN — LIDOCAINE HYDROCHLORIDE 50 MG: 10 INJECTION, SOLUTION EPIDURAL; INFILTRATION; INTRACAUDAL; PERINEURAL at 10:13:00

## 2020-08-14 RX ADMIN — PROTAMINE SULFATE 50 MG: 10 INJECTION, SOLUTION INTRAVENOUS at 13:40:00

## 2020-08-14 RX ADMIN — PROTAMINE SULFATE 50 MG: 10 INJECTION, SOLUTION INTRAVENOUS at 13:11:00

## 2020-08-14 RX ADMIN — PHYTONADIONE 2.5 MG: 10 INJECTION, EMULSION INTRAMUSCULAR; INTRAVENOUS; SUBCUTANEOUS at 13:25:00

## 2020-08-14 RX ADMIN — MIDAZOLAM HYDROCHLORIDE 2 MG: 1 INJECTION INTRAMUSCULAR; INTRAVENOUS at 10:04:00

## 2020-08-14 RX ADMIN — VANCOMYCIN HYDROCHLORIDE 1000 MG: 1 INJECTION, POWDER, LYOPHILIZED, FOR SOLUTION INTRAVENOUS at 10:45:00

## 2020-08-14 RX ADMIN — HEPARIN SODIUM 23000 UNITS: 1000 INJECTION, SOLUTION INTRAVENOUS; SUBCUTANEOUS at 11:17:00

## 2020-08-14 RX ADMIN — PHYTONADIONE 2.5 MG: 10 INJECTION, EMULSION INTRAMUSCULAR; INTRAVENOUS; SUBCUTANEOUS at 13:13:00

## 2020-08-14 NOTE — PROGRESS NOTES
[facility]  Progress Note     Antonia Cuellar Patient Status:  Inpatient    1933 MRN [id number]   [facility] 5NW-A Attending Kath Tucker, DO   Hosp Day # 3 PCP Karely Rodgers     Chief Complaint: dyspnea    S: Patient    Did 137 137   K 3.9   < > 3.5 3.6 3.8 4.1      < > 106 107 106 107   CO2 23.0   < > 26.0 24.0 26.0 26.0   ALKPHO 77  --  76  --  76  --    AST 15  --  19  --  19  --    ALT 19  --  17  --  19  --    BILT 0.7  --  0.4  --  0.6  --    TP 6.7  --  6.8  -- with basilar crackles  Heart: RRR,+ murmur  Abdomen: Soft, NTND  Extremities: No Edema     Assessment and Plan:     S/pleft sided thoracentesis - no studies sent but likely transudative, plan to drain right in OR, will ask for fluid to be sent, orders in p

## 2020-08-14 NOTE — ANESTHESIA PROCEDURE NOTES
Central Line  Performed by: Cortney Hernandez MD  Authorized by: Cortney Hernandez MD     General Information and Staff    Procedure Start:  8/14/2020 10:25 AM  Procedure End:  8/14/2020 10:40 AM  Anesthesiologist:  Cortney Hernandez MD  Performed by:

## 2020-08-14 NOTE — PLAN OF CARE
Received patient at 0730. Alert and Oriented x4, anxious for surgery today. Tele Rhythm NSR. O2 saturation WNL on 4 L NC, SOBwE noted. Breath sounds clear. Fall precautions in place. Bed is locked and in low position.  Call light and personal items within r unsuccessful or patient reports new pain  - Anticipate increased pain with activity and pre-medicate as appropriate  Outcome: Progressing     Problem: SAFETY ADULT - FALL  Goal: Free from fall injury  Description  INTERVENTIONS:  - Assess pt frequently for supplementation based on oxygen saturation or ABGs  - Provide Smoking Cessation handout, if applicable  - Encourage broncho-pulmonary hygiene including cough, deep breathe, Incentive Spirometry  - Assess the need for suctioning and perform as needed  - Ass

## 2020-08-14 NOTE — DIETARY NOTE
Clinical Nutrition    Dietitian consult received per cardiac rehab standing order. Pt to be educated by cardiac rehab staff and encouraged to attend outpatient classes taught by RD. RD available PRN.     Chuy Talbert MS, RD, LDN  Clinical Dietitian  Page

## 2020-08-14 NOTE — ADDENDUM NOTE
Addendum  created 08/14/20 1619 by Wilfredo Cain MD    Clinical Note Signed, Intraprocedure Blocks edited

## 2020-08-14 NOTE — PLAN OF CARE
Assumed care of pt around 1930. Pt Aox4. 4L NC. NSR on tele. VSS. Heparin gtt infusing per protocol. Plan for second case MVR tomorrow. Pt updated on plan. Scrub completed tonight. Consents signed and placed in chart. Will continue to monitor.        Proble behaviors that affect risk of falls.   - [place] fall precautions as indicated by assessment.  - Educate pt/family on patient safety including physical limitations  - Instruct pt to call for assistance with activity based on assessment  - Modify environme Respiratory Therapy support as indicated  - Manage/alleviate anxiety  - Monitor for signs/symptoms of CO2 retention  Outcome: Progressing     Problem: METABOLIC/FLUID AND ELECTROLYTES - ADULT  Goal: Electrolytes maintained within normal limits  Description

## 2020-08-14 NOTE — ANESTHESIA PROCEDURE NOTES
Arterial Line  Performed by: Laxmi Slade MD  Authorized by: Laxmi Slade MD     General Information and Staff    Procedure Start:  8/14/2020 10:11 AM  Procedure End:  8/14/2020 10:15 AM  Anesthesiologist:  Laxmi Slade MD  Performed By

## 2020-08-14 NOTE — ANESTHESIA POSTPROCEDURE EVALUATION
[facility] Patient Status:  Inpatient   Age/Gender 80year old male MRN [id number]   [facility] 6NE-A Attending Madhu Huffman, [address] Day # 3 PCP Cuca Sctot       Anesthesia Post-op Note    Procedure(s):  Willa Collins

## 2020-08-14 NOTE — PROGRESS NOTES
[facility]  Progress Note        Chuck Murphy Patient Status:  Inpatient    1933 MRN [id number]   [facility] 6NE-A Attending Farrah Jones,    Hosp Day # 3 PCP [facility] [place]       Subjective:   Intubated, sedated    Objectiv ON 8/15/2020] aspirin  300 mg Rectal Daily   • docusate sodium  100 mg Oral BID   • famoTIDine  20 mg Oral BID    Or   • famoTIDine  20 mg Intravenous BID   • vancomycin  15 mg/kg Intravenous Q12H   • mupirocin  1 Application Nasal BID   • ceFAZolin  2 g I nitro, weaning as tolerated  -Planning to remain intubated overnight   -Holding heparin for PE until ok with Dr. Chana Gomez     PE:  -Nonocclusive PE  -RV normal on admission  -Holding heparin as above    Yane Calix MD  8/14/2020  [facility]  Interventional C

## 2020-08-14 NOTE — ANESTHESIA PROCEDURE NOTES
Airway  Date/Time: 8/14/2020 10:19 AM  Urgency: elective    Airway not difficult    General Information and Staff    Patient location during procedure: OR  Anesthesiologist: Mikaela Drake MD  Performed: anesthesiologist     Indications and Patient Con

## 2020-08-14 NOTE — PROGRESS NOTES
Received pt from [facility] s/p MVR 29 mm tissue valve. Intubated/sedated with precedex. Nitric infusing at 40 ppm on arrival. Decreased to 20 ppm. Per Dr Villalba [place] will decrease at 2200 to 10 ppm then turn off of 0500 and plan to extubate. Usual lines.  Chest tube

## 2020-08-14 NOTE — RESPIRATORY THERAPY NOTE
Received patient on VC+ 16/450/60%/+8 with Svitlana at 40ppm. Weaned patient per MD to VC+ 16/450/40%/+5 with Svitlana at 20ppm. Breath sounds are bilaterally clear/diminished. Suctioning scant amount of clear thin secretions.  Plan to keep patient intubated overnigh

## 2020-08-14 NOTE — ANESTHESIA PROCEDURE NOTES
Procedure Performed: DEVON    Start Time:        End Time:      Preanesthesia Checklist:  Patient identified, IV assessed, risks and benefits discussed, monitors and equipment assessed, procedure being performed at surgeon's request and anesthesia consent ob not present. Plaque thickness less than 3 mm. Mobile plaque not present. Atria    Right Atrium:  Size normal.  Spontaneous echo contrast not present. Thrombus not present. Tumor not present. Device present. Left Atrium:  Size dilated.   Spo

## 2020-08-15 ENCOUNTER — APPOINTMENT (OUTPATIENT)
Dept: GENERAL RADIOLOGY | Facility: HOSPITAL | Age: 85
DRG: 219 | End: 2020-08-15
Attending: NURSE PRACTITIONER
Payer: MEDICARE

## 2020-08-15 ENCOUNTER — APPOINTMENT (OUTPATIENT)
Dept: GENERAL RADIOLOGY | Facility: HOSPITAL | Age: 85
DRG: 219 | End: 2020-08-15
Attending: THORACIC SURGERY (CARDIOTHORACIC VASCULAR SURGERY)
Payer: MEDICARE

## 2020-08-15 PROBLEM — I34.0 SEVERE MITRAL VALVE REGURGITATION: Status: ACTIVE | Noted: 2020-08-11

## 2020-08-15 PROBLEM — I34.1 MVP (MITRAL VALVE PROLAPSE): Chronic | Status: ACTIVE | Noted: 2020-08-15

## 2020-08-15 LAB
ARTERIAL BLD GAS O2 SATURATION: 91 % (ref 92–100)
ARTERIAL BLD GAS O2 SATURATION: 94 % (ref 92–100)
ARTERIAL BLD GAS O2 SATURATION: 95 % (ref 92–100)
ARTERIAL BLOOD GAS BASE EXCESS: -2 MMOL/L (ref ?–2)
ARTERIAL BLOOD GAS BASE EXCESS: -2.2 MMOL/L (ref ?–2)
ARTERIAL BLOOD GAS BASE EXCESS: -2.6 MMOL/L (ref ?–2)
ARTERIAL BLOOD GAS HCO3: 19.1 MEQ/L (ref 22–26)
ARTERIAL BLOOD GAS HCO3: 22.6 MEQ/L (ref 22–26)
ARTERIAL BLOOD GAS HCO3: 22.9 MEQ/L (ref 22–26)
ARTERIAL BLOOD GAS PCO2: 23 MM HG (ref 35–45)
ARTERIAL BLOOD GAS PCO2: 38 MM HG (ref 35–45)
ARTERIAL BLOOD GAS PCO2: 41 MM HG (ref 35–45)
ARTERIAL BLOOD GAS PH: 7.36 (ref 7.35–7.45)
ARTERIAL BLOOD GAS PH: 7.4 (ref 7.35–7.45)
ARTERIAL BLOOD GAS PH: 7.54 (ref 7.35–7.45)
ARTERIAL BLOOD GAS PO2: 63 MM HG (ref 80–105)
ARTERIAL BLOOD GAS PO2: 70 MM HG (ref 80–105)
ARTERIAL BLOOD GAS PO2: 83 MM HG (ref 80–105)
BASOPHILS # BLD AUTO: 0.01 X10(3) UL (ref 0–0.2)
BASOPHILS NFR BLD AUTO: 0.1 %
BLOOD TYPE BARCODE: 6200
BLOOD TYPE BARCODE: 7300
BUN BLD-MCNC: 19 MG/DL (ref 7–18)
CALCIUM BLD-MCNC: 8.3 MG/DL (ref 8.5–10.1)
CALCULATED O2 SATURATION: 93 % (ref 92–100)
CALCULATED O2 SATURATION: 96 % (ref 92–100)
CALCULATED O2 SATURATION: 96 % (ref 92–100)
CARBOXYHEMOGLOBIN: 1.5 % SAT (ref 0–3)
CARBOXYHEMOGLOBIN: 1.5 % SAT (ref 0–3)
CARBOXYHEMOGLOBIN: 1.6 % SAT (ref 0–3)
CHLORIDE SERPL-SCNC: 115 MMOL/L (ref 98–112)
CO2 SERPL-SCNC: 25 MMOL/L (ref 21–32)
CREAT BLD-MCNC: 0.95 MG/DL (ref 0.7–1.3)
DEPRECATED RDW RBC AUTO: 46.1 FL (ref 35.1–46.3)
EOSINOPHIL # BLD AUTO: 0 X10(3) UL (ref 0–0.7)
EOSINOPHIL NFR BLD AUTO: 0 %
ERYTHROCYTE [DISTWIDTH] IN BLOOD BY AUTOMATED COUNT: 13.7 % (ref 11–15)
FIO2: 40 %
FIO2: 40 %
FIO2: 50 %
GLUCOSE BLD-MCNC: 101 MG/DL (ref 70–99)
GLUCOSE BLD-MCNC: 102 MG/DL (ref 70–99)
GLUCOSE BLD-MCNC: 104 MG/DL (ref 70–99)
GLUCOSE BLD-MCNC: 105 MG/DL (ref 70–99)
GLUCOSE BLD-MCNC: 107 MG/DL (ref 70–99)
GLUCOSE BLD-MCNC: 107 MG/DL (ref 70–99)
GLUCOSE BLD-MCNC: 110 MG/DL (ref 70–99)
GLUCOSE BLD-MCNC: 111 MG/DL (ref 70–99)
GLUCOSE BLD-MCNC: 111 MG/DL (ref 70–99)
GLUCOSE BLD-MCNC: 112 MG/DL (ref 70–99)
GLUCOSE BLD-MCNC: 113 MG/DL (ref 70–99)
GLUCOSE BLD-MCNC: 113 MG/DL (ref 70–99)
GLUCOSE BLD-MCNC: 116 MG/DL (ref 70–99)
GLUCOSE BLD-MCNC: 116 MG/DL (ref 70–99)
GLUCOSE BLD-MCNC: 120 MG/DL (ref 70–99)
GLUCOSE BLD-MCNC: 155 MG/DL (ref 70–99)
HAV IGM SER QL: 2.4 MG/DL (ref 1.6–2.6)
HCT VFR BLD AUTO: 35.8 % (ref 39–53)
HGB BLD-MCNC: 12.1 G/DL (ref 13–17.5)
IMM GRANULOCYTES # BLD AUTO: 0.06 X10(3) UL (ref 0–1)
IMM GRANULOCYTES NFR BLD: 0.6 %
IONIZED CALCIUM: 1.14 MMOL/L (ref 1.12–1.32)
IONIZED CALCIUM: 1.19 MMOL/L (ref 1.12–1.32)
IONIZED CALCIUM: 1.19 MMOL/L (ref 1.12–1.32)
LACTIC ACID ARTERIAL: <1.6 MMOL/L (ref 0.5–2)
LYMPHOCYTES # BLD AUTO: 0.26 X10(3) UL (ref 1–4)
LYMPHOCYTES NFR BLD AUTO: 2.6 %
MCH RBC QN AUTO: 30.7 PG (ref 26–34)
MCHC RBC AUTO-ENTMCNC: 33.8 G/DL (ref 31–37)
MCV RBC AUTO: 90.9 FL (ref 80–100)
METHEMOGLOBIN: 0.5 % SAT (ref 0.4–1.5)
METHEMOGLOBIN: 0.6 % SAT (ref 0.4–1.5)
METHEMOGLOBIN: 0.6 % SAT (ref 0.4–1.5)
MONOCYTES # BLD AUTO: 0.52 X10(3) UL (ref 0.1–1)
MONOCYTES NFR BLD AUTO: 5.3 %
NEUTROPHILS # BLD AUTO: 9.02 X10 (3) UL (ref 1.5–7.7)
NEUTROPHILS # BLD AUTO: 9.02 X10(3) UL (ref 1.5–7.7)
NEUTROPHILS NFR BLD AUTO: 91.4 %
P/F RATIO: 166.9 MMHG
P/F RATIO: 169.9 MMHG
P/F RATIO: 184.5 MMHG
PATIENT TEMPERATURE: 96.6 F
PATIENT TEMPERATURE: 97.2 F
PATIENT TEMPERATURE: 98.6 F
PEEP: 5 CM H2O
PLATELET # BLD AUTO: 156 10(3)UL (ref 150–450)
POTASSIUM BLOOD GAS: 3.3 MMOL/L (ref 3.6–5.1)
POTASSIUM BLOOD GAS: 3.8 MMOL/L (ref 3.6–5.1)
POTASSIUM BLOOD GAS: 3.8 MMOL/L (ref 3.6–5.1)
POTASSIUM SERPL-SCNC: 3.5 MMOL/L (ref 3.5–5.1)
PRESSURE SUPPORT: 5 CM H2O
PRESSURE SUPPORT: 5 CM H2O
RBC # BLD AUTO: 3.94 X10(6)UL (ref 3.8–5.8)
SODIUM BLOOD GAS: 143 MMOL/L (ref 136–144)
SODIUM SERPL-SCNC: 144 MMOL/L (ref 136–145)
TIDAL VOLUME: 450 ML
TOTAL HEMOGLOBIN: 11.6 G/DL (ref 12.6–17.4)
TOTAL HEMOGLOBIN: 11.9 G/DL (ref 12.6–17.4)
TOTAL HEMOGLOBIN: 12 G/DL (ref 12.6–17.4)
VENT RATE: 16 /MIN
WBC # BLD AUTO: 9.9 X10(3) UL (ref 4–11)

## 2020-08-15 PROCEDURE — 71045 X-RAY EXAM CHEST 1 VIEW: CPT | Performed by: THORACIC SURGERY (CARDIOTHORACIC VASCULAR SURGERY)

## 2020-08-15 PROCEDURE — 99291 CRITICAL CARE FIRST HOUR: CPT | Performed by: INTERNAL MEDICINE

## 2020-08-15 PROCEDURE — 99232 SBSQ HOSP IP/OBS MODERATE 35: CPT | Performed by: INTERNAL MEDICINE

## 2020-08-15 PROCEDURE — 71045 X-RAY EXAM CHEST 1 VIEW: CPT | Performed by: NURSE PRACTITIONER

## 2020-08-15 RX ORDER — LEVOTHYROXINE SODIUM 0.12 MG/1
125 TABLET ORAL
Status: DISCONTINUED | OUTPATIENT
Start: 2020-08-16 | End: 2020-08-21

## 2020-08-15 RX ORDER — ALBUMIN, HUMAN INJ 5% 5 %
250 SOLUTION INTRAVENOUS ONCE
Status: COMPLETED | OUTPATIENT
Start: 2020-08-15 | End: 2020-08-15

## 2020-08-15 RX ORDER — FINASTERIDE 5 MG/1
5 TABLET, FILM COATED ORAL
Status: DISCONTINUED | OUTPATIENT
Start: 2020-08-15 | End: 2020-08-21

## 2020-08-15 RX ORDER — TAMSULOSIN HYDROCHLORIDE 0.4 MG/1
0.4 CAPSULE ORAL
Status: DISCONTINUED | OUTPATIENT
Start: 2020-08-15 | End: 2020-08-21

## 2020-08-15 RX ORDER — ACETAMINOPHEN 325 MG/1
650 TABLET ORAL EVERY 4 HOURS PRN
Status: DISCONTINUED | OUTPATIENT
Start: 2020-08-15 | End: 2020-08-21

## 2020-08-15 NOTE — PHYSICAL THERAPY NOTE
Order received for Physical Therapy services. Spoke with RN & patient just extubated and not appropriate for PT services this date. Will check status and proceed with PT services as appropriate.

## 2020-08-15 NOTE — PLAN OF CARE
Pt woke on night shift to follow commands at all extremities, nodding appropriately, clearly anxious. Sedation titrated up, PRNs used for pain. Overall, pt restful overnight. Sedation decreased in AM in anticipation of vent weaning.     Scheduled ABG rev

## 2020-08-15 NOTE — PROGRESS NOTES
[facility]   [facility]S Progress Note    Chapin Alexander Patient Status:  Inpatient    1933 MRN [id number]   [facility] 6NE-A Attending Deepali Johnson DO   Hosp Day # 4 PCP Dustin Montoya     Subjective:   Intubated and lightly sedated in D5W 250ml, 5-300 mcg/min, Intravenous, Continuous PRN  norepinephrine (LEVOPHED) 4 mg/250 ml premix infusion, 0.5-30 mcg/min, Intravenous, Continuous PRN  Nitroprusside Sodium (NIPRIDE) 50 mg in dextrose 5 % 250 mL infusion, 0.1-4 mcg/kg/min (Dosing Ancil Vladimir injection 50 mL, 50 mL, Intravenous, Q15 Min PRN    Or  glucose (DEX4) oral liquid 30 g, 30 g, Oral, Q15 Min PRN    Or  Glucose-Vitamin C (DEX-4) chewable tab 8 tablet, 8 tablet, Oral, Q15 Min PRN  finasteride (PROSCAR) tab 5 mg, 5 mg, Oral, Daily  LORazep [facility])     Mitral valve insufficiency     Acute cystitis without hematuria     Pneumonia of both lungs due to infectious organism     Pneumonia of both lungs due to infectious organism, unspecified part of lung     Acute pulmonary embolism without acute cor

## 2020-08-15 NOTE — PROGRESS NOTES
S/P- MVR. POD#1   Pt extubated earlier in the day and doing well. Alert and oriented. happy with care. No c/o any awareness. Teeth intact.   BP (!) 83/53 (BP Location: Right arm)   Pulse 75   Temp 98.3 °F (36.8 °C) (Pulmonary Artery)   Resp 20   Ht 1.702

## 2020-08-15 NOTE — PROGRESS NOTES
[facility]  Progress Note     Cameron Los Patient Status:  Inpatient    1933 MRN [id number]   [facility] 5NW-A Attending Hannah Roger, DO   Hosp Day # 4 PCP Floridalma Angulo     Chief Complaint: dyspnea    S: s/p MVR, voice 137 140 144   K 3.5   < > 3.8 4.1 4.3 3.5      < > 106 107 109 115*   CO2 26.0   < > 26.0 26.0 26.0 25.0   ALKPHO 76  --  76  --   --   --    AST 19  --  19  --   --   --    ALT 17  --  19  --   --   --    BILT 0.4  --  0.6  --   --   --    TP 6.8  -

## 2020-08-15 NOTE — OPERATIVE REPORT
[address]    PATIENT'S NAME: Vicky Meyers   ATTENDING PHYSICIAN: Jhonny Ashton DO   OPERATING PHYSICIAN: Genesis Elena MD   PATIENT ACCOUNT#:   [id number]    LOCATION:  [address] [place] [address]  MEDICAL RECORD #:   [id number]       YOB: 1933 pulmonary artery, and right ventricle were all distended. The patient was systemically heparinized. The aorta was cannulated with an 18-Martiniquais arterial inflow cannula. SVC and IVC were cannulated with 24 and 30-Martiniquais venous cannulas, respectively.   Byp administered. Pacing leads were applied to the heart. Chest was closed in usual fashion. The patient was taken to the ICU in stable condition. The patient's daughter was updated by me regarding the patient's condition and conduct of the operation.     D

## 2020-08-15 NOTE — CONSULTS
ENDOCRINOLOGY CONSULTATION    Attending physician:  Jose Juan Calderón,   Consulting physican:  Flory Carney MD    Admission Date:  8/11/2020  Consultation Date:  8/15/2020      Reason for consultation: Mgmt of borderline diabetes     Chief Compl AYDE   • HEART MITRAL VALVE REPAIR/REPLACEMENT N/A 8/14/2020    Performed by Brown Whitmore MD at [address]      On 8/14/2020: 29 mm Magna Ease bioprosthesis   • THYROIDECTOMY  5/16/12    THYROIDECTOMY   • TONSILLECTOMY per tab 1 tablet, 1 tablet, Oral, Q4H PRN  ·   Or  · HYDROcodone-acetaminophen (NORCO)  MG per tab 2 tablet, 2 tablet, Oral, Q4H PRN  · morphINE sulfate (PF) 4 MG/ML injection 2 mg, 2 mg, Intravenous, Q2H PRN  ·   Or  · morphINE sulfate (PF) 4 MG/ML PRN  · Magnesium Sulfate IVPB premix SOLN 2 g, 2 g, Intravenous, PRN  · [COMPLETED] Vancomycin HCl (VANCOCIN) 1,000 mg in sodium chloride 0.9% 250 mL IVPB add-vantage, 15 mg/kg, Intravenous, Q12H  · mupirocin (BACTROBAN) 2% nasal ointment OINT 1 Applicatio Daily  · [COMPLETED] furosemide (LASIX) injection 20 mg, 20 mg, Intravenous, Once  · Levothyroxine Sodium tab 125 mcg, 125 mcg, Oral, Before dinner        Allergies:  No Known Allergies      Social History    Socioeconomic History      Marital status:  [place] Serum      1.6 - 2.6 mg/dL 2.4         Component      Latest Ref Rng & Units 8/15/2020   WBC      4.0 - 11.0 x10(3) uL 9.9   RBC      3.80 - 5.80 x10(6)uL 3.94   Hemoglobin      13.0 - 17.5 g/dL 12.1 (L)   Hematocrit      39.0 - 53.0 % 35.8 (L)   Platelet

## 2020-08-15 NOTE — RESPIRATORY THERAPY NOTE
Received patient on 16/450/40%/+5 and 20 ppm of NO. ABG drawn at 2130 and ordered to turn nitric down to 10 PPM and then turn off at 0500. Nitric tuned off @ 0510 and placed patient on SBT on PS5/+5 40%.  Sats remained in low 90's Patient giving the followi

## 2020-08-16 LAB
ANION GAP SERPL CALC-SCNC: 7 MMOL/L (ref 0–18)
ARTERIAL BLD GAS O2 SATURATION: 95 % (ref 92–100)
ARTERIAL BLOOD GAS BASE EXCESS: -3 MMOL/L (ref ?–2)
ARTERIAL BLOOD GAS HCO3: 20.8 MEQ/L (ref 22–26)
ARTERIAL BLOOD GAS PCO2: 32 MM HG (ref 35–45)
ARTERIAL BLOOD GAS PH: 7.43 (ref 7.35–7.45)
ARTERIAL BLOOD GAS PO2: 77 MM HG (ref 80–105)
BUN BLD-MCNC: 23 MG/DL (ref 7–18)
BUN/CREAT SERPL: 19.7 (ref 10–20)
CALCIUM BLD-MCNC: 7.9 MG/DL (ref 8.5–10.1)
CALCULATED O2 SATURATION: 96 % (ref 92–100)
CARBOXYHEMOGLOBIN: 1.4 % SAT (ref 0–3)
CHLORIDE SERPL-SCNC: 110 MMOL/L (ref 98–112)
CO2 SERPL-SCNC: 24 MMOL/L (ref 21–32)
CREAT BLD-MCNC: 1.17 MG/DL (ref 0.7–1.3)
FIO2: 50 %
GLUCOSE BLD-MCNC: 132 MG/DL (ref 70–99)
GLUCOSE BLD-MCNC: 140 MG/DL (ref 70–99)
GLUCOSE BLD-MCNC: 148 MG/DL (ref 70–99)
GLUCOSE BLD-MCNC: 150 MG/DL (ref 70–99)
GLUCOSE BLD-MCNC: 200 MG/DL (ref 70–99)
IONIZED CALCIUM: 1.17 MMOL/L (ref 1.12–1.32)
L/M: 12 L/MIN
LACTIC ACID ARTERIAL: <1.6 MMOL/L (ref 0.5–2)
METHEMOGLOBIN: 0.5 % SAT (ref 0.4–1.5)
OSMOLALITY SERPL CALC.SUM OF ELEC: 298 MOSM/KG (ref 275–295)
P/F RATIO: 160.9 MMHG
PATIENT TEMPERATURE: 97.2 F
POTASSIUM BLOOD GAS: 3.6 MMOL/L (ref 3.6–5.1)
POTASSIUM SERPL-SCNC: 3.9 MMOL/L (ref 3.5–5.1)
SODIUM BLOOD GAS: 143 MMOL/L (ref 136–144)
SODIUM SERPL-SCNC: 141 MMOL/L (ref 136–145)
T3FREE SERPL-MCNC: 0.88 PG/ML (ref 2.4–4.2)
T4 FREE SERPL-MCNC: 1.5 NG/DL (ref 0.8–1.7)
TOTAL HEMOGLOBIN: 11.2 G/DL (ref 12.6–17.4)
TSI SER-ACNC: 0.11 MIU/ML (ref 0.36–3.74)

## 2020-08-16 PROCEDURE — 99233 SBSQ HOSP IP/OBS HIGH 50: CPT | Performed by: INTERNAL MEDICINE

## 2020-08-16 PROCEDURE — 99232 SBSQ HOSP IP/OBS MODERATE 35: CPT | Performed by: INTERNAL MEDICINE

## 2020-08-16 RX ORDER — FUROSEMIDE 10 MG/ML
20 INJECTION INTRAMUSCULAR; INTRAVENOUS ONCE
Status: COMPLETED | OUTPATIENT
Start: 2020-08-16 | End: 2020-08-16

## 2020-08-16 RX ORDER — FLUTICASONE PROPIONATE 50 MCG
1 SPRAY, SUSPENSION (ML) NASAL DAILY
Status: DISCONTINUED | OUTPATIENT
Start: 2020-08-16 | End: 2020-08-21

## 2020-08-16 RX ORDER — WARFARIN SODIUM 5 MG/1
5 TABLET ORAL
Status: COMPLETED | OUTPATIENT
Start: 2020-08-16 | End: 2020-08-16

## 2020-08-16 NOTE — PLAN OF CARE
Pt progressing very well. Ambulating hallways (needs special shoes due to leg height difference). BP normal for patient (SBP baseline 100's). SR. Cordis and Pacer Wires removed, plan to start coumadin tonight. Pain well controlled.  Oxygenates well on room

## 2020-08-16 NOTE — PLAN OF CARE
PT c/o of post-nasal drip, dry throat. Incisional pain treated with tylenol, pt denies need for anything stronger. Pt states difficulty starting urine stream, painful when urinating.   Once pt able to sit on bedside commode, pt able to void    Pt denies

## 2020-08-16 NOTE — PHYSICAL THERAPY NOTE
PHYSICAL THERAPY EVALUATION - INPATIENT     Room Number: 5638/5532-W  Evaluation Date: 8/16/2020  Type of Evaluation: Initial  Physician Order: PT Eval and Treat    Presenting Problem: s/p MVR 8/14  Reason for Therapy: Mobility Dysfunction and Discha PTA reports completely Ind no device lives alone nephew/niece assist every couple of weeks do not live in area, reprots can find help for errands/rides    SUBJECTIVE  \"I need to use the bathroom\"    Patient self-stated goal is go home    [address] [facility] in hospital room?: A Little   -   Climbing 3-5 steps with a railing?: A Little       AM-PAC Score:  Raw Score: 18   Approx Degree of Impairment: 46.58%   Standardized Score (AM-PAC Scale): 43.63   CMS Modifier (G-Code): CK    FUNCTIONAL ABILITY STATUS  Bryanna Lebron skilled inpatient PT to address the above deficits to assist patient in returning to prior to level of function. DISCHARGE RECOMMENDATIONS  PT Discharge Recommendations: Home with home health PT    PLAN  PT Treatment Plan: Bed mobility; Body mechanics; Endu

## 2020-08-16 NOTE — PLAN OF CARE
Pt progressing well. Extubated this morning at 0925, Able to wean O2 to 3L via NC. Stable HR & Rhyth,. ABle to wean off dobutamine by end of shift. nelsy Chavez, f/c removed. Cordis and PW remains. Pt passing flatus and tolerating clear liquid diet.  Pain mi

## 2020-08-16 NOTE — PROGRESS NOTES
ENDOCRINOLOGY PROGRESS NOTE    Typed by Salvador Arceo MD on 8/16/2020      S:  Pt sitting up in the chair. Tolerating diet but having some dysphagia.       O:  BP 98/64   Pulse 85   Temp 98.5 °F (36.9 °C) (Temporal)   Resp 20   Ht 67\"   Wt 169 lb place.  · Pt's diabetes is managed by Dr. Ap Parker  · Home diabetic regimen is diet therapy      2. 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

## 2020-08-16 NOTE — PROGRESS NOTES
[facility] Cardiology  Progress Note    Riddhi Garcia Patient Status:  Inpatient    1933 MRN [id number]   [facility] 6NE-A Attending Sanket Becerra, DO   Hosp Day # 5 PCP Narcisa Doll     Subjective:  POD #2 MVR. Extubated.   Sitting with 29 mm bioprosthesis. Doing quite well from cardiac standpoint. Gentle diuresis as tolerated by BP and renal function. Resume cardiac medications as able. Increase activity as tolerated. Small incidental PE by CTA on admission, no LLE DVT.   I

## 2020-08-16 NOTE — PLAN OF CARE
[facility]   CVS Progress Note    Rico List Patient Status:  Inpatient    1933 MRN [id number]   [facility] 6NE-A Attending Gage Mukherjee DO   Hosp Day # 5 PCP Quynh Klein     Subjective:  Sitting up in chair.  C/o righ Continuous PRN  Nitroprusside Sodium (NIPRIDE) 50 mg in dextrose 5 % 250 mL infusion, 0.1-4 mcg/kg/min (Dosing Weight), Intravenous, Continuous PRN  aspirin EC EC tab 325 mg, 325 mg, Oral, Daily    Or  aspirin 300 MG rectal suppository 300 mg, 300 mg, Rect Intact   Lungs: CTA/diminished lung sounds. IS 1000  Heart: s1s2 RRR. NSR. sternum stable   Abdomen: soft, nontender hypoactive BS   Extremities:+pulses, trace edema        Assessment/Plan:  Patient Active Problem List:     Hypothyroid     Goiter     Osteop

## 2020-08-16 NOTE — PROGRESS NOTES
[facility]  Progress Note     Hever Metz Patient Status:  Inpatient    1933 MRN [id number]   [facility] 5NW-A Attending Tavo Kim, DO   Hosp Day # 5 PCP Miguel Shields     Chief Complaint: dyspnea    S: complaining of --       < > 137   < > 140 144 141   K 3.5   < > 3.8   < > 4.3 3.5 3.9      < > 106   < > 109 115* 110   CO2 26.0   < > 26.0   < > 26.0 25.0 24.0   ALKPHO 76  --  76  --   --   --   --    AST 19  --  19  --   --   --   --    ALT 17  --  19  - SCDs  · CODE status: full  · [place]: yes  · [employer];  Yes- IJ - removing     Discussed with patient/RN

## 2020-08-16 NOTE — OCCUPATIONAL THERAPY NOTE
OCCUPATIONAL THERAPY EVALUATION - INPATIENT     Room Number: 2692/1581-Q  Evaluation Date: 8/16/2020  Type of Evaluation: Initial  Presenting Problem: MVR    Physician Order: IP Consult to Occupational Therapy  Reason for Therapy: ADL/IADL Dysfunction and couple of weeks do not live in area, reprots can find help for errands/rides    SUBJECTIVE   Pt stated, \"I am doing okay, I think. \"    Patient self-stated goal is to go home     OBJECTIVE  Precautions: Sternal;Cardiac  Fall Risk: Standard fall risk    WE PPE worn: surgical mask, gloves.  Pt was received up in chair for session, therapist educated pt on sternal precautions and log roll technique, therapist completed MMT and ROM on pt within sternal precautions, pt was able to amb x1 in room and hallway and b technique education;Equipment eval/education;Patient/Family training;Patient/Family education; Endurance training;UE strengthening/ROM; Functional transfer training  Rehab Potential : Good  Frequency (Obs): 3-5x/week  Number of Visits to 12 e Ayuhs Darleendrheather

## 2020-08-17 ENCOUNTER — TELEPHONE (OUTPATIENT)
Dept: CARDIOLOGY | Age: 85
End: 2020-08-17

## 2020-08-17 LAB
ANION GAP SERPL CALC-SCNC: 3 MMOL/L (ref 0–18)
ATRIAL RATE: 54 BPM
BLOOD TYPE BARCODE: 6200
BLOOD TYPE BARCODE: 6200
BUN BLD-MCNC: 23 MG/DL (ref 7–18)
BUN/CREAT SERPL: 26.4 (ref 10–20)
CALCIUM BLD-MCNC: 8.7 MG/DL (ref 8.5–10.1)
CHLORIDE SERPL-SCNC: 105 MMOL/L (ref 98–112)
CO2 SERPL-SCNC: 28 MMOL/L (ref 21–32)
CREAT BLD-MCNC: 0.87 MG/DL (ref 0.7–1.3)
GLUCOSE BLD-MCNC: 105 MG/DL (ref 70–99)
GLUCOSE BLD-MCNC: 112 MG/DL (ref 70–99)
GLUCOSE BLD-MCNC: 123 MG/DL (ref 70–99)
GLUCOSE BLD-MCNC: 126 MG/DL (ref 70–99)
GLUCOSE BLD-MCNC: 129 MG/DL (ref 70–99)
GLUCOSE BLD-MCNC: 96 MG/DL (ref 70–99)
INR BLD: 1.17 (ref 0.89–1.11)
INR BLD: 1.2 (ref 0.89–1.11)
ISTAT ACTIVATED CLOTTING TIME: 367 SECONDS (ref 74–137)
ISTAT ACTIVATED CLOTTING TIME: 510 SECONDS (ref 74–137)
ISTAT ACTIVATED CLOTTING TIME: 599 SECONDS (ref 74–137)
ISTAT BLOOD GAS BASE EXCESS: 2 MMOL/L (ref ?–30)
ISTAT BLOOD GAS BASE EXCESS: 6 MMOL/L (ref ?–30)
ISTAT BLOOD GAS HCO3: 26.6 MEQ/L (ref 22–26)
ISTAT BLOOD GAS HCO3: 31.1 MEQ/L (ref 22–26)
ISTAT BLOOD GAS O2 SATURATION: 100 % (ref 92–100)
ISTAT BLOOD GAS O2 SATURATION: 100 % (ref 92–100)
ISTAT BLOOD GAS PCO2: 43.4 MMHG (ref 35–45)
ISTAT BLOOD GAS PCO2: 49.5 MMHG (ref 35–45)
ISTAT BLOOD GAS PH: 7.4 (ref 7.35–7.45)
ISTAT BLOOD GAS PH: 7.41 (ref 7.35–7.45)
ISTAT BLOOD GAS PO2: >400 MMHG (ref 80–105)
ISTAT BLOOD GAS PO2: >400 MMHG (ref 80–105)
ISTAT BLOOD GAS TCO2: 28 MMOL/L (ref 22–32)
ISTAT BLOOD GAS TCO2: 33 MMOL/L (ref 22–32)
ISTAT HEMATOCRIT: 24 %
ISTAT HEMATOCRIT: 26 %
ISTAT IONIZED CALCIUM: 1.07 MMOL/L (ref 1.12–1.32)
ISTAT IONIZED CALCIUM: 1.1 MMOL/L (ref 1.12–1.32)
ISTAT POTASSIUM: 4.4 MMOL/L (ref 3.6–5.1)
ISTAT POTASSIUM: 4.5 MMOL/L (ref 3.6–5.1)
ISTAT SODIUM: 133 MMOL/L (ref 136–145)
ISTAT SODIUM: 135 MMOL/L (ref 136–145)
OSMOLALITY SERPL CALC.SUM OF ELEC: 287 MOSM/KG (ref 275–295)
P AXIS: 38 DEGREES
P-R INTERVAL: 252 MS
POTASSIUM SERPL-SCNC: 3.6 MMOL/L (ref 3.5–5.1)
PSA SERPL DL<=0.01 NG/ML-MCNC: 15.3 SECONDS (ref 12.4–14.6)
PSA SERPL DL<=0.01 NG/ML-MCNC: 15.6 SECONDS (ref 12.4–14.6)
Q-T INTERVAL: 474 MS
QRS DURATION: 98 MS
QTC CALCULATION (BEZET): 449 MS
R AXIS: 17 DEGREES
SODIUM SERPL-SCNC: 136 MMOL/L (ref 136–145)
T AXIS: 73 DEGREES
VENTRICULAR RATE: 54 BPM

## 2020-08-17 PROCEDURE — 99291 CRITICAL CARE FIRST HOUR: CPT | Performed by: INTERNAL MEDICINE

## 2020-08-17 PROCEDURE — 99232 SBSQ HOSP IP/OBS MODERATE 35: CPT | Performed by: INTERNAL MEDICINE

## 2020-08-17 RX ORDER — WARFARIN SODIUM 5 MG/1
5 TABLET ORAL NIGHTLY
Status: DISCONTINUED | OUTPATIENT
Start: 2020-08-17 | End: 2020-08-18

## 2020-08-17 RX ORDER — POTASSIUM CHLORIDE 20 MEQ/1
40 TABLET, EXTENDED RELEASE ORAL EVERY 4 HOURS
Status: COMPLETED | OUTPATIENT
Start: 2020-08-17 | End: 2020-08-17

## 2020-08-17 RX ORDER — FUROSEMIDE 10 MG/ML
20 INJECTION INTRAMUSCULAR; INTRAVENOUS DAILY
Status: COMPLETED | OUTPATIENT
Start: 2020-08-17 | End: 2020-08-19

## 2020-08-17 NOTE — PLAN OF CARE
Pt tolerated norco for pain and sleep. States his pain is well controlled and he slept over night. Dr. Yamila Epps updated this AM.  Orders entered for pt to transfer.

## 2020-08-17 NOTE — PROGRESS NOTES
[facility]  CV Surgery Progress Note    Kevon Galicia Patient Status:  Inpatient    1933 MRN [id number]   [facility] 6NE-A Attending Eve Mejia, DO   Hosp Day # 6 PCP Dhaval Haddad     Subjective:  Patient seen and examined D/w Dr Jr Jamison, APRN   8/17/2020  7:22 AM

## 2020-08-17 NOTE — PROGRESS NOTES
ENDOCRINOLOGY PROGRESS NOTE    Typed by Joycelyn Morales MD on 8/17/2020      S:  Pt resting in bed. No complaints.       O: /76   Pulse 76   Temp 97.3 °F (36.3 °C) (Temporal)   Resp 15   Ht 67\"   Wt 171 lb 8.3 oz (77.8 kg)   SpO2 100%   BMI 26 Plan:    1. Borderline diabetes: HgA1C 5.7%      · Pt has only required coverage twice in the last 2 days. Since the glucoses are stable, I will discontinue the Accucheks and the Novolog coverage.    · Pt's diabetes is managed by Dr. Karely Rodgers  · [place]

## 2020-08-17 NOTE — PLAN OF CARE
Assumed care of patient @9714. Okay to transfer to tele floor per CV Surgery. Up in chair eating meals. Ambulating in hallways well. VSS. Denies pain and has no complaints at this time. Replacing potassium. Updated daughter on plan of care.  Will continue t physical deficits and behaviors that affect risk of falls.   - [place] fall precautions as indicated by assessment.  - Educate pt/family on patient safety including physical limitations  - Instruct pt to call for assistance with activity based on assessme respiratory difficulty  - Respiratory Therapy support as indicated  - Manage/alleviate anxiety  - Monitor for signs/symptoms of CO2 retention  Outcome: Progressing     Problem: METABOLIC/FLUID AND ELECTROLYTES - ADULT  Goal: Electrolytes maintained within gait  - Educate and engage patient/family in tolerated activity level and precautions  - Recommend use of  RW for transfers and ambulation  - educate in cardiac binder   Outcome: Progressing     Problem: Impaired Activities of Daily Living  Goal: Achieve h

## 2020-08-17 NOTE — PROGRESS NOTES
[facility]    Inpatient Cardiology Critical Care Progress Note    Victor Manuel Gunter Patient Status:  Inpatient    1933 MRN [id number]   [facility] 6NE-A Attending Oscar Montilla, DO   Hosp Day # 6 PCP Emily Benson by Yaniv Atkinson MD at [facility] [address]      On 8/14/2020: 29 mm Magna Ease bioprosthesis   • THYROIDECTOMY  5/16/12    THYROIDECTOMY   • TONSILLECTOMY       Family History   Problem Relation Age of Onset   • Other (Other) Father

## 2020-08-17 NOTE — PROGRESS NOTES
[facility]  Progress Note     Twan Llamas Patient Status:  Inpatient    1933 MRN [id number]   [facility] 5NW-A Attending Karla Faria, DO   Hosp Day # 6 PCP Daivd [place]     Chief Complaint: dyspnea    S: has no complai 8. 3* 7.9*   ALB 3.0*  --  2.7*  --   --   --   --       < > 137   < > 140 144 141   K 3.5   < > 3.8   < > 4.3 3.5 3.9      < > 106   < > 109 115* 110   CO2 26.0   < > 26.0   < > 26.0 25.0 24.0   ALKPHO 76  --  76  --   --   --   --    AST 19  - Finasteride  6. Bilat pleural effusions- s/p thoracentesis - presumed transudative 2/2 #1  7. Hypothryoidism - synthroid  8.  Disposition: Acute Rehab eval pending    Okay for transfer to [facility]     Quality:  · DVT Prophylaxis: SCDs  · CODE status: full  · Levonia Beverage

## 2020-08-17 NOTE — HOME CARE LIAISON
Received referral from Elyse Baez. Met with patient at the bedside to discuss home health services and offer choice. Patient is agreeable to [facility] services at discharge. Brochure and contact information provided. Any questions addressed. Will follow.

## 2020-08-17 NOTE — CONSULTS
PHYSICAL MEDICINE AND REHABILITATION CONSULTATION       Location [address] Attending Gregor Hernandez DO   Hosp Day # 6 PCP Misti Reed     Patient Identification  Berkley Ko is a 80year old male.   :  1933  Admit Date:   Daily  Warfarin Sodium (COUMADIN) tab 5 mg, 5 mg, Oral, Nightly  [COMPLETED] Warfarin Sodium (COUMADIN) tab 5 mg, 5 mg, Oral, Once at night  [COMPLETED] furosemide (LASIX) injection 20 mg, 20 mg, Intravenous, Once  Fluticasone Propionate (FLONASE) 50 MCG/A BID  PEG 3350 (MIRALAX) powder packet 17 g, 17 g, Oral, Daily PRN  magnesium hydroxide (MILK OF MAGNESIA) 400 MG/5ML suspension 30 mL, 30 mL, Oral, Daily PRN  bisacodyl (DULCOLAX) rectal suppository 10 mg, 10 mg, Rectal, Daily PRN  Fleet Enema (FLEET) 7-19 dextrose 5 % 100 mL ADD-vantage, 3.375 g, Intravenous, Once  [COMPLETED] iohexol (OMNIPAQUE) 350 MG/ML injection 100 mL, 100 mL, Intravenous, ONCE PRN  [COMPLETED] Heparin Sodium (Porcine) 5000 UNIT/ML injection 5,900 Units, 80 Units/kg, Intravenous, Once file      Highest education level: Not on file    Tobacco Use      Smoking status: Never Smoker      Smokeless tobacco: Never Used    Substance and Sexual Activity      Alcohol use: Yes        Frequency: Monthly or less      Drug use: No      FUNCTIONAL ST Dictated by (CST): Maite Bonilla MD on 8/15/2020 at 3:51 PM     Finalized by (CST): Maite Bonilla MD on 8/15/2020 at 3:52 PM       ASSESSMENT:    Deficits of self care and mobility secondary to MVR with 29 mm bioprosteheis  CHF  Acute pulmonary e

## 2020-08-17 NOTE — CM/SW NOTE
08/17/20 1100   CM/SW Referral Data   Referral Source    Reason for Referral Discharge planning   Informant Patient   Patient Info   Patient's Mental Status Alert;Oriented   Patient's [address]   Number of Levels in Home 1   Num

## 2020-08-18 LAB
ANION GAP SERPL CALC-SCNC: 0 MMOL/L (ref 0–18)
BASOPHILS # BLD AUTO: 0.04 X10(3) UL (ref 0–0.2)
BASOPHILS NFR BLD AUTO: 0.5 %
BUN BLD-MCNC: 18 MG/DL (ref 7–18)
BUN/CREAT SERPL: 21.7 (ref 10–20)
CALCIUM BLD-MCNC: 8.1 MG/DL (ref 8.5–10.1)
CHLORIDE SERPL-SCNC: 111 MMOL/L (ref 98–112)
CO2 SERPL-SCNC: 27 MMOL/L (ref 21–32)
CREAT BLD-MCNC: 0.83 MG/DL (ref 0.7–1.3)
DEPRECATED RDW RBC AUTO: 47.1 FL (ref 35.1–46.3)
EOSINOPHIL # BLD AUTO: 0.12 X10(3) UL (ref 0–0.7)
EOSINOPHIL NFR BLD AUTO: 1.6 %
ERYTHROCYTE [DISTWIDTH] IN BLOOD BY AUTOMATED COUNT: 13.5 % (ref 11–15)
GLUCOSE BLD-MCNC: 108 MG/DL (ref 70–99)
HCT VFR BLD AUTO: 33.7 % (ref 39–53)
HGB BLD-MCNC: 10.8 G/DL (ref 13–17.5)
IMM GRANULOCYTES # BLD AUTO: 0.13 X10(3) UL (ref 0–1)
IMM GRANULOCYTES NFR BLD: 1.7 %
INR BLD: 1.92 (ref 0.89–1.11)
LYMPHOCYTES # BLD AUTO: 0.95 X10(3) UL (ref 1–4)
LYMPHOCYTES NFR BLD AUTO: 12.5 %
MCH RBC QN AUTO: 30.4 PG (ref 26–34)
MCHC RBC AUTO-ENTMCNC: 32 G/DL (ref 31–37)
MCV RBC AUTO: 94.9 FL (ref 80–100)
MONOCYTES # BLD AUTO: 0.76 X10(3) UL (ref 0.1–1)
MONOCYTES NFR BLD AUTO: 10 %
NEUTROPHILS # BLD AUTO: 5.6 X10 (3) UL (ref 1.5–7.7)
NEUTROPHILS # BLD AUTO: 5.6 X10(3) UL (ref 1.5–7.7)
NEUTROPHILS NFR BLD AUTO: 73.7 %
OSMOLALITY SERPL CALC.SUM OF ELEC: 288 MOSM/KG (ref 275–295)
PLATELET # BLD AUTO: 152 10(3)UL (ref 150–450)
POTASSIUM SERPL-SCNC: 4.5 MMOL/L (ref 3.5–5.1)
POTASSIUM SERPL-SCNC: 4.5 MMOL/L (ref 3.5–5.1)
PSA SERPL DL<=0.01 NG/ML-MCNC: 22.5 SECONDS (ref 12.4–14.6)
RBC # BLD AUTO: 3.55 X10(6)UL (ref 3.8–5.8)
SODIUM SERPL-SCNC: 138 MMOL/L (ref 136–145)
WBC # BLD AUTO: 7.6 X10(3) UL (ref 4–11)

## 2020-08-18 PROCEDURE — 99232 SBSQ HOSP IP/OBS MODERATE 35: CPT | Performed by: INTERNAL MEDICINE

## 2020-08-18 RX ORDER — WARFARIN SODIUM 1 MG/1
1 TABLET ORAL NIGHTLY
Status: DISCONTINUED | OUTPATIENT
Start: 2020-08-18 | End: 2020-08-19

## 2020-08-18 NOTE — PLAN OF CARE
Assumed patient care this morning around 0730 am. Patient is alert and oriented, vital signs are WNL. Pedal pulses palpable, incisions are C/D/I. Patient with no pain, ambulating in halls.  Catalina  recommending home with residential. Awaiting CTU

## 2020-08-18 NOTE — PROGRESS NOTES
[facility]    Inpatient Cardiology Progress Note    Arnulfo Power Patient Status:  Inpatient    1933 MRN [id number]   [facility] 8NE-A Attending Bella Hyde,    Hosp Day # 7 PCP Jayashree Madrigal     Telemetry: P Onset   • Other (Other) Father         MENIERE DISEASE   • Diabetes Mother    • Heart Disease Mother    • Other (Other) Mother    • Cancer Sister         skin      reports that he has never smoked.  He has never used smokeless tobacco. He reports current al

## 2020-08-18 NOTE — DIETARY NOTE
[address]     Admitting diagnosis:  Pneumonia of both lungs due to infectious organism, unspecified part of lung [J18.9]  Mitral valve insufficiency, unspecified etiology [I34.0]  Acute pulmonary embolism witho

## 2020-08-18 NOTE — PROGRESS NOTES
[facility]  CV Surgery Progress Note    Smileycuca Frias Patient Status:  Inpatient    1933 MRN [id number]   [facility] 6NE-A Attending Mehnaz Moseley, DO   Hosp Day # 7 PCP Napolechari Cord     Subjective:  Patient seen and examined Interval extubation, bilateral chest tubes, enteric catheter and swans Dayron catheter. No pneumothorax.      Assessment/Plan:   # POD 4 s/p MVR   -HD stable   -post op anemia - monitor   -Vol OL - weight remains above baseline, continue Lasix 20mg IV daily

## 2020-08-18 NOTE — PROGRESS NOTES
[facility]  Progress Note     Twan Llamas Patient Status:  Inpatient    1933 MRN [id number]   [facility] 5NW-A Attending Karla Faria, DO   Hosp Day # 7 PCP Daivd Denver     Chief Complaint: dyspnea    S: voice still ho 138   K 3.8   < > 3.9 3.6 4.5  4.5      < > 110 105 111   CO2 26.0   < > 24.0 28.0 27.0   ALKPHO 76  --   --   --   --    AST 19  --   --   --   --    ALT 19  --   --   --   --    BILT 0.6  --   --   --   --    TP 6.6  --   --   --   --     < > = maryann Prophylaxis: SCDs, Coumadin  · CODE status: full  · [place]: no  · Central Line: no       Discussed with patient/RN

## 2020-08-18 NOTE — PLAN OF CARE
Transfer Note    Patient transferred to room. Vital signs taken. Patient updated on plan of care. Verbalized understanding. Denies questions or concerns. Oriented to room, shown call light use.

## 2020-08-19 LAB
ANION GAP SERPL CALC-SCNC: 4 MMOL/L (ref 0–18)
BASOPHILS # BLD AUTO: 0.06 X10(3) UL (ref 0–0.2)
BASOPHILS NFR BLD AUTO: 0.8 %
BUN BLD-MCNC: 16 MG/DL (ref 7–18)
BUN/CREAT SERPL: 22.9 (ref 10–20)
CALCIUM BLD-MCNC: 8.1 MG/DL (ref 8.5–10.1)
CHLORIDE SERPL-SCNC: 109 MMOL/L (ref 98–112)
CO2 SERPL-SCNC: 25 MMOL/L (ref 21–32)
CREAT BLD-MCNC: 0.7 MG/DL (ref 0.7–1.3)
DEPRECATED RDW RBC AUTO: 45.7 FL (ref 35.1–46.3)
EOSINOPHIL # BLD AUTO: 0.17 X10(3) UL (ref 0–0.7)
EOSINOPHIL NFR BLD AUTO: 2.3 %
ERYTHROCYTE [DISTWIDTH] IN BLOOD BY AUTOMATED COUNT: 13.2 % (ref 11–15)
GLUCOSE BLD-MCNC: 107 MG/DL (ref 70–99)
HCT VFR BLD AUTO: 34.3 % (ref 39–53)
HGB BLD-MCNC: 11.2 G/DL (ref 13–17.5)
IMM GRANULOCYTES # BLD AUTO: 0.23 X10(3) UL (ref 0–1)
IMM GRANULOCYTES NFR BLD: 3.1 %
INR BLD: 2.29 (ref 0.89–1.11)
LYMPHOCYTES # BLD AUTO: 1.01 X10(3) UL (ref 1–4)
LYMPHOCYTES NFR BLD AUTO: 13.7 %
MCH RBC QN AUTO: 30.6 PG (ref 26–34)
MCHC RBC AUTO-ENTMCNC: 32.7 G/DL (ref 31–37)
MCV RBC AUTO: 93.7 FL (ref 80–100)
MONOCYTES # BLD AUTO: 0.75 X10(3) UL (ref 0.1–1)
MONOCYTES NFR BLD AUTO: 10.2 %
NEUTROPHILS # BLD AUTO: 5.16 X10 (3) UL (ref 1.5–7.7)
NEUTROPHILS # BLD AUTO: 5.16 X10(3) UL (ref 1.5–7.7)
NEUTROPHILS NFR BLD AUTO: 69.9 %
OSMOLALITY SERPL CALC.SUM OF ELEC: 288 MOSM/KG (ref 275–295)
PLATELET # BLD AUTO: 180 10(3)UL (ref 150–450)
POTASSIUM SERPL-SCNC: 4.1 MMOL/L (ref 3.5–5.1)
PSA SERPL DL<=0.01 NG/ML-MCNC: 25.8 SECONDS (ref 12.4–14.6)
RBC # BLD AUTO: 3.66 X10(6)UL (ref 3.8–5.8)
SODIUM SERPL-SCNC: 138 MMOL/L (ref 136–145)
WBC # BLD AUTO: 7.4 X10(3) UL (ref 4–11)

## 2020-08-19 PROCEDURE — 99232 SBSQ HOSP IP/OBS MODERATE 35: CPT | Performed by: HOSPITALIST

## 2020-08-19 PROCEDURE — 99232 SBSQ HOSP IP/OBS MODERATE 35: CPT | Performed by: INTERNAL MEDICINE

## 2020-08-19 NOTE — PROGRESS NOTES
[facility]  CV Surgery Progress Note    Victor Manuel Gunter Patient Status:  Inpatient    1933 MRN [id number]   [facility] 6NE-A Attending Oscar Montilla, DO   Hosp Day # 8 PCP Emily Benson     Subjective:  Patient seen and examined pneumothorax. CONCLUSION:  Interval extubation, bilateral chest tubes, enteric catheter and swans Dayron catheter. No pneumothorax.      Assessment/Plan:   # POD 5 s/p MVR   -HD stable   -post op anemia - monitor   -Vol OL - weight remains above baseline, c

## 2020-08-19 NOTE — PLAN OF CARE
Pt is A&Ox4. RA, lungs diminished, NSR on tele, denies cardiovascular symptoms. Coumadin given tonight INR 1.92. continent B&B. Denies any pain. Charles/scds on. Fall precautions in place, bed alarm on. Up with 1 assist and a walker. Midsternal incision cdi. Anticipate increased pain with activity and pre-medicate as appropriate  8/18/2020 2245 by Glen Veliz RN  Outcome: Completed  8/18/2020 2245 by Glen Veliz RN  Outcome: Progressing     Problem: SAFETY ADULT - FALL  Goal: Free from fall injury  Ayush Achieves optimal ventilation and oxygenation  Description  INTERVENTIONS:  - Assess for changes in respiratory status  - Assess for changes in mentation and behavior  - Position to facilitate oxygenation and minimize respiratory effort  - Oxygen supplement Evaluate fluid balance, assess for edema, trend weights  8/18/2020 2245 by Sanchez Ramos RN  Outcome: Progressing  8/18/2020 2245 by Sanchez Ramos, RN  Outcome: Progressing  Goal: Absence of cardiac arrhythmias or at baseline  Description  INTERVENTIONS of hyperglycemia and hypoglycemia  - Administer ordered medications to maintain glucose within target range  - Assess barriers to adequate nutritional intake and initiate nutrition consult as needed  - Instruct patient on self management of diabetes  8/18/

## 2020-08-19 NOTE — PLAN OF CARE
POD#5-Pt is A&O X3, on RA, maintaining 02 sat above 90%, SR per tele, mid sternal incision steri-strip dry and intact, ambulatory, ar well. Voiced no c/o`s at present. Poc updated, mobilize, encourage IS, pain control, hold coumadin tonight as ordered.  Cp supplementation based on oxygen saturation or ABGs  - Provide Smoking Cessation handout, if applicable  - Encourage broncho-pulmonary hygiene including cough, deep breathe, Incentive Spirometry  - Assess the need for suctioning and perform as needed  - Ass Activities of Daily Living  Goal: Achieve highest/safest level of independence in self care  Description  Interventions:  - Assess ability and encourage patient to participate in ADLs to maximize function  - Promote sitting position while performing ADLs s

## 2020-08-19 NOTE — PROGRESS NOTES
[facility]  Cardiology Progress Note    Yamilka Camacho Patient Status:  Inpatient    1933 MRN [id number]   [facility] 8NE-A Attending Mariia Sesay MD   [facility] Day # 8 PCP Cuca Scott     Subjective:  Doing well.  Sitting up i Or   • aspirin  300 mg Rectal Daily   • docusate sodium  100 mg Oral BID   • famoTIDine  20 mg Oral BID    Or   • famoTIDine  20 mg Intravenous BID     • sodium chloride 20 mL/hr at 08/15/20 2000   • DOBUTamine Stopped (08/15/20 1830)   • Nitroglycerin in

## 2020-08-19 NOTE — PROGRESS NOTES
[facility]  Progress Note     Chata Lovell Patient Status:  Inpatient    1933 MRN [id number]   [facility] 5NW-A Attending Shahrzad Powell, DO   Hosp Day # 8 PCP Ap [place]     Chief Complaint: dyspnea    S: no acute overn --   --   --   --    ALT 19  --   --   --   --    BILT 0.6  --   --   --   --    TP 6.6  --   --   --   --     < > = values in this interval not displayed. Estimated Creatinine Clearance: 69.5 mL/min (based on SCr of 0.7 mg/dL).     Recent Labs   Lab

## 2020-08-19 NOTE — PHYSICAL THERAPY NOTE
PHYSICAL THERAPY TREATMENT NOTE - INPATIENT    Room Number: 7238/6401-C     Session: 1  Number of Visits to Meet Established Goals: 3    Presenting Problem: s/p MVR 8/14    Problem List  Principal Problem:    S/P MVR (mitral valve replacement)  Active Pro Static Sitting: Good  Dynamic Sitting: Fair +           Static Standing: Fair  Dynamic Standing: Fair -    ACTIVITY TOLERANCE                         O2 WALK                    AM-PAC '6-Clicks' INPATIENT SHORT FORM - BASIC MOBIL CGA. Pt requires CGA for safety due to increased jonathan and scissoring gait pattern. Pt states he does not feel he needs a walker, however educated on usage for next session to improve stability.  Pt returned to chair, call light within reach, and needs me independent  To AAD   Goal #3 Patient is able to ambulate 400 feet with assist device: AAD at assistance level: modified independent      Goal #4 Ascend/descend 3 steps with rail and supervision   Goal #5 Patient will Ind recall sternal precautions   Goal

## 2020-08-20 ENCOUNTER — APPOINTMENT (OUTPATIENT)
Dept: GENERAL RADIOLOGY | Facility: HOSPITAL | Age: 85
DRG: 219 | End: 2020-08-20
Attending: PHYSICIAN ASSISTANT
Payer: MEDICARE

## 2020-08-20 LAB
ANION GAP SERPL CALC-SCNC: 2 MMOL/L (ref 0–18)
BUN BLD-MCNC: 16 MG/DL (ref 7–18)
BUN/CREAT SERPL: 20 (ref 10–20)
CALCIUM BLD-MCNC: 8.3 MG/DL (ref 8.5–10.1)
CHLORIDE SERPL-SCNC: 109 MMOL/L (ref 98–112)
CO2 SERPL-SCNC: 27 MMOL/L (ref 21–32)
CREAT BLD-MCNC: 0.8 MG/DL (ref 0.7–1.3)
DEPRECATED RDW RBC AUTO: 45.1 FL (ref 35.1–46.3)
ERYTHROCYTE [DISTWIDTH] IN BLOOD BY AUTOMATED COUNT: 13.2 % (ref 11–15)
GLUCOSE BLD-MCNC: 103 MG/DL (ref 70–99)
HCT VFR BLD AUTO: 34.4 % (ref 39–53)
HGB BLD-MCNC: 11.3 G/DL (ref 13–17.5)
INR BLD: 1.78 (ref 0.89–1.11)
MCH RBC QN AUTO: 30.6 PG (ref 26–34)
MCHC RBC AUTO-ENTMCNC: 32.8 G/DL (ref 31–37)
MCV RBC AUTO: 93.2 FL (ref 80–100)
OSMOLALITY SERPL CALC.SUM OF ELEC: 287 MOSM/KG (ref 275–295)
PLATELET # BLD AUTO: 192 10(3)UL (ref 150–450)
POTASSIUM SERPL-SCNC: 4.2 MMOL/L (ref 3.5–5.1)
PSA SERPL DL<=0.01 NG/ML-MCNC: 21.2 SECONDS (ref 12.4–14.6)
RBC # BLD AUTO: 3.69 X10(6)UL (ref 3.8–5.8)
SODIUM SERPL-SCNC: 138 MMOL/L (ref 136–145)
WBC # BLD AUTO: 6.3 X10(3) UL (ref 4–11)

## 2020-08-20 PROCEDURE — 99232 SBSQ HOSP IP/OBS MODERATE 35: CPT | Performed by: HOSPITALIST

## 2020-08-20 PROCEDURE — 71046 X-RAY EXAM CHEST 2 VIEWS: CPT | Performed by: PHYSICIAN ASSISTANT

## 2020-08-20 PROCEDURE — 99232 SBSQ HOSP IP/OBS MODERATE 35: CPT | Performed by: INTERNAL MEDICINE

## 2020-08-20 RX ORDER — WARFARIN SODIUM 2.5 MG/1
2.5 TABLET ORAL
Status: COMPLETED | OUTPATIENT
Start: 2020-08-20 | End: 2020-08-20

## 2020-08-20 RX ORDER — TORSEMIDE 5 MG/1
10 TABLET ORAL DAILY
Status: DISCONTINUED | OUTPATIENT
Start: 2020-08-20 | End: 2020-08-21

## 2020-08-20 NOTE — PROGRESS NOTES
[facility]  Progress Note    Chuck Murphy Patient Status:  Inpatient    1933 MRN [id number]   [facility] 8NE-A Attending Demarcus De La O MD   [facility] Day # 9 PCP Memory Ballwin     Subjective:  Pt feeling well.  Dysphonia is his o (36.8 °C), temperature source Oral, resp. rate 17, height 67\", weight 160 lb 4.4 oz (72.7 kg), SpO2 97 %.   General: A&O X 3, VSS, NAD, afeb  Neck: no JVD  Lungs: CTAB, diminished @ bases  Heart: RRR  Abdomen: soft, normoactive BS  Extremities: trace edema

## 2020-08-20 NOTE — PROGRESS NOTES
[facility]  Progress Note     Geeta Ramirez Patient Status:  Inpatient    1933 MRN [id number]   [facility] 5NW-A Attending Angus Meade, DO   Hosp Day # 9 PCP Claudia Barclay     Chief Complaint: dyspnea    S:  C/o voice liliana K 3.8   < > 4.5  4.5 4.1 4.2      < > 111 109 109   CO2 26.0   < > 27.0 25.0 27.0   ALKPHO 76  --   --   --   --    AST 19  --   --   --   --    ALT 19  --   --   --   --    BILT 0.6  --   --   --   --    TP 6.6  --   --   --   --     < > = values SCDs, Coumadin  · CODE status: full  · [place]: no  · Central Line: no     Discussed with patient/RN  Lamine Saez NP       Agree with above assessment and plan    S- no acute overnight events    General- NAD  Chest- CTAB  CVs- RRR    Plan   Check CXR  Tr

## 2020-08-20 NOTE — PROGRESS NOTES
[facility]  Cardiology Progress Note    Sylvie Kyle Patient Status:  Inpatient    1933 MRN [id number]   [facility] 8NE-A Attending Rafael Doyle MD   [facility] Day # 9 PCP Gregor Rosado     Subjective:  Still very hoarse this m BID    Or   • famoTIDine  20 mg Intravenous BID     • sodium chloride 20 mL/hr at 08/15/20 2000   • DOBUTamine Stopped (08/15/20 1830)   • Nitroglycerin in D5W     • norepinephrine     • nitroprusside (NIPRIDE) 50 mg in D5W infusion         Assessment:  ·

## 2020-08-20 NOTE — CARDIAC REHAB
Cardiac rehab education completed with patient.   Patient referred to outpatient cardiac rehab program.

## 2020-08-20 NOTE — PLAN OF CARE
ALERT AND ORIENTED X4 ON TELE MONITOR HR 70'S SINUS RHYTHM. MID STERNAL STERI STRIP C/D/I. DENIES ANY PAIN. ALL NEEDS ATTENDED AND WILL CONTINUE TO MONITOR. CALL LIGHT WITHIN REACH.   Problem: Patient/Family Goals  Goal: Patient/Family Long Term Goal  Descr handout, if applicable  - Encourage broncho-pulmonary hygiene including cough, deep breathe, Incentive Spirometry  - Assess the need for suctioning and perform as needed  - Assess and instruct to report SOB or any respiratory difficulty  - Respiratory Ther care  Description  Interventions:  - Assess ability and encourage patient to participate in ADLs to maximize function  - Promote sitting position while performing ADLs such as feeding, grooming, and bathing  - Educate and encourage patient/family in [place]

## 2020-08-20 NOTE — PROGRESS NOTES
Met with patient to discuss discharge instructions, activity restrictions and recommendations, follow up visits, all questions answered, encouraged to call with any questions or concerns.  Pt lives alone, will be home alone at d/c, Dr. Alex Muñiz aware, pt states

## 2020-08-21 ENCOUNTER — ANTI-COAG (OUTPATIENT)
Dept: CARDIOLOGY | Age: 85
End: 2020-08-21

## 2020-08-21 VITALS
TEMPERATURE: 98 F | SYSTOLIC BLOOD PRESSURE: 89 MMHG | HEART RATE: 96 BPM | OXYGEN SATURATION: 96 % | WEIGHT: 159.81 LBS | BODY MASS INDEX: 25.08 KG/M2 | RESPIRATION RATE: 20 BRPM | HEIGHT: 67 IN | DIASTOLIC BLOOD PRESSURE: 53 MMHG

## 2020-08-21 DIAGNOSIS — Z79.01 LONG TERM (CURRENT) USE OF ANTICOAGULANTS: ICD-10-CM

## 2020-08-21 DIAGNOSIS — I26.99 PE (PULMONARY THROMBOEMBOLISM) (CMD): ICD-10-CM

## 2020-08-21 LAB
ANION GAP SERPL CALC-SCNC: 4 MMOL/L (ref 0–18)
BUN BLD-MCNC: 18 MG/DL (ref 7–18)
BUN/CREAT SERPL: 19.8 (ref 10–20)
CALCIUM BLD-MCNC: 8.5 MG/DL (ref 8.5–10.1)
CHLORIDE SERPL-SCNC: 108 MMOL/L (ref 98–112)
CO2 SERPL-SCNC: 25 MMOL/L (ref 21–32)
CREAT BLD-MCNC: 0.91 MG/DL (ref 0.7–1.3)
DEPRECATED RDW RBC AUTO: 46.3 FL (ref 35.1–46.3)
ERYTHROCYTE [DISTWIDTH] IN BLOOD BY AUTOMATED COUNT: 13.1 % (ref 11–15)
GLUCOSE BLD-MCNC: 103 MG/DL (ref 70–99)
HCT VFR BLD AUTO: 34 % (ref 39–53)
HGB BLD-MCNC: 11 G/DL (ref 13–17.5)
INR BLD: 1.59 (ref 0.89–1.11)
MCH RBC QN AUTO: 31 PG (ref 26–34)
MCHC RBC AUTO-ENTMCNC: 32.4 G/DL (ref 31–37)
MCV RBC AUTO: 95.8 FL (ref 80–100)
OSMOLALITY SERPL CALC.SUM OF ELEC: 286 MOSM/KG (ref 275–295)
PLATELET # BLD AUTO: 219 10(3)UL (ref 150–450)
POTASSIUM SERPL-SCNC: 4 MMOL/L (ref 3.5–5.1)
PSA SERPL DL<=0.01 NG/ML-MCNC: 19.4 SECONDS (ref 12.4–14.6)
RBC # BLD AUTO: 3.55 X10(6)UL (ref 3.8–5.8)
SODIUM SERPL-SCNC: 137 MMOL/L (ref 136–145)
WBC # BLD AUTO: 6.5 X10(3) UL (ref 4–11)

## 2020-08-21 PROCEDURE — 99232 SBSQ HOSP IP/OBS MODERATE 35: CPT | Performed by: INTERNAL MEDICINE

## 2020-08-21 PROCEDURE — 99239 HOSP IP/OBS DSCHRG MGMT >30: CPT | Performed by: HOSPITALIST

## 2020-08-21 RX ORDER — WARFARIN SODIUM 2.5 MG/1
2.5 TABLET ORAL NIGHTLY
Qty: 60 TABLET | Refills: 1 | Status: SHIPPED | OUTPATIENT
Start: 2020-08-21

## 2020-08-21 RX ORDER — ACETAMINOPHEN 325 MG/1
650 TABLET ORAL EVERY 6 HOURS PRN
Refills: 0 | Status: SHIPPED | COMMUNITY
Start: 2020-08-21

## 2020-08-21 RX ORDER — WARFARIN SODIUM 5 MG/1
5 TABLET ORAL
Status: DISCONTINUED | OUTPATIENT
Start: 2020-08-21 | End: 2020-08-21

## 2020-08-21 NOTE — PROGRESS NOTES
[facility]  CV Surgery Progress Note    Renee Mora Patient Status:  Inpatient    1933 MRN [id number]   [facility] 8NE-A Attending Judy Cortez MD   Hosp Day # 10 PCP Rola Schafer     Subjective:  Patient seen and examin left lung is clear. There is no pneumothorax. CARDIAC:  The patient is status post mitral valve replacement. Sternotomy wires noted. There is no CHF. MEDIASTINUM:  Normal.  [place]:                        Normal.  BONES:  Normal for age.   CONCLUSION:  No

## 2020-08-21 NOTE — PROGRESS NOTES
[facility]  Cardiology Progress Note    Twan Llamas Patient Status:  Inpatient    1933 MRN [id number]   [facility] 8NE-A Attending Mari Chen MD   [facility] Day # 10 PCP Daivd [place]     Subjective:  Sitting up in chair eat No focal deficits. Neck: No JVD   Cardiac: Regular rate and rhythm, S1, S2 normal   Lungs:  Slightly diminished right base, Left CTA   Abdomen: Soft, NT, ND, + BS  Extremities: Trace BLE   Skin: Warm and dry.  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

## 2020-08-21 NOTE — PLAN OF CARE
8/21/2020    A&Ox4  RA  NSR HR 86  Continent of B and B  SBA    POC: poss D/C today- waiting on Cards and CTS    Denies any pain or sob  No cough since the morning with one episode after bending over to tie his shoe    Problem: Patient/Family Goals  Goal: ABGs  - Provide Smoking Cessation handout, if applicable  - Encourage broncho-pulmonary hygiene including cough, deep breathe, Incentive Spirometry  - Assess the need for suctioning and perform as needed  - Assess and instruct to report SOB or any respirat highest/safest level of independence in self care  Description  Interventions:  - Assess ability and encourage patient to participate in ADLs to maximize function  - Promote sitting position while performing ADLs such as feeding, grooming, and bathing  - E

## 2020-08-21 NOTE — PROGRESS NOTES
[facility]  Progress Note     Nicolette Shipley Patient Status:  Inpatient    1933 MRN [id number]   [facility] 5NW-A Attending Livia Edwards, DO   Hosp Day # 10 PCP Gricelda Robertson     Chief Complaint: dyspnea    S:    Voice stil mg/dL). Recent Labs   Lab 08/19/20  0536 08/20/20  0540 08/21/20  0520   PTP 25.8* 21.2* 19.4*   INR 2.29* 1.78* 1.59*       No results for input(s): TROP, CK in the last 168 hours. Imaging: Imaging data reviewed in Epic.     Medications:   • tor pleural effusion-  Patient states he is not SOB even on exertion    Anticipate dc later today pending clearance form CTS and Cardiology    Sonam Dean M.D.   Adela Howe

## 2020-08-21 NOTE — PROGRESS NOTES
POD#7   S/P MVR. Was informed by cards that pt has dysphonia. Pt has hoarseness since extubation but is improving slowly. No pain. Intaop there were no issues with intubation. A/P Hoarseness which is improving etiology uncertain.  Local injury vs RLN

## 2020-08-21 NOTE — PLAN OF CARE
8/21/2020    Pt to be discharged today  Removed LDA  Removed monitor and notified tele  Gave discharge papers and instructions.  Pt understood  Pt friend will  pt at [facility]  And take home where he lives alone

## 2020-08-22 LAB
THYROGLOBULIN AB: 9 IU/ML
THYROGLOBULIN BY -MS/MS, SERUM/PLASMA: <0.5 NG/ML

## 2020-08-22 NOTE — DISCHARGE SUMMARY
[facility]  DISCHARGE SUMMARY     [facility] Patient Status:  Inpatient    1933 MRN [id number]   [facility] 8NE-A Attending No att. providers found   Hosp Day # 10 PCP Jhon Barboza     Date of Admission: 2020  Date decision was made to proceed with mitral valve replacement. Patient is done well postoperatively maintained normal sinus rhythm was de-lined in a timely manner. Did receive 2 units packed RBCs, unit of platelets and 2 units of FFP on day of surgery.   Pos function was normal. Systolic pressure was markedly increased. 5. Tricuspid valve: There was moderate regurgitation.     Impressions:   This study is compared with previous dated 08/07/2020:Compared to the prior study, there has been no significant interva strength  · how much to take  · when to take this  · reasons to take this      Take 2 tablets (650 mg total) by mouth every 6 (six) hours as needed for Pain.    Refills:  0        CONTINUE taking these medications      Instructions Prescription details   fi chest xray, central scheduling will call you to arrange the appointment    Ellie Amaya  [address] [place] [address]  [phone]    Schedule an appointment as soon as possible for a visit in 1 week      Rachana Lara MD  [address] trace  LE edema.   -----------------------------------------------------------------------------------------------  PATIENT DISCHARGE INSTRUCTIONS: See electronic chart    Annabel Castillo NP    Time spent:  > 30 minutes

## 2020-08-24 ENCOUNTER — ANTI-COAG (OUTPATIENT)
Dept: CARDIOLOGY | Age: 85
End: 2020-08-24

## 2020-08-24 ENCOUNTER — PATIENT OUTREACH (OUTPATIENT)
Dept: CASE MANAGEMENT | Age: 85
End: 2020-08-24

## 2020-08-24 ENCOUNTER — TELEPHONE (OUTPATIENT)
Dept: CARDIOLOGY UNIT | Facility: HOSPITAL | Age: 85
End: 2020-08-24

## 2020-08-24 DIAGNOSIS — Z79.01 LONG TERM (CURRENT) USE OF ANTICOAGULANTS: ICD-10-CM

## 2020-08-24 DIAGNOSIS — I26.99 PE (PULMONARY THROMBOEMBOLISM) (CMD): ICD-10-CM

## 2020-08-24 LAB — INR PPP: 1.6

## 2020-08-24 NOTE — PROGRESS NOTES
Follow Up Phone Call    1. How are you doing now that you are home? \"I am doing well. I am able to sleep. I am eating ok. I can walk up and down stairs ok and doing some walks. \"    2. Have there been any changes in your wound/incision since going home? \"ok. It looks fine. \"    3. Is your pain manageable at home? \"I take Tylenol and that helps. \"    4. Are you following the walking routine given to you in the hospital? \"yes, I only have done one today- it is hot\" Encouraged patient to walk INSIDE!!! He said he may or wait until the sun goes down    5. Are you continuing to use your incentive spirometer? yes    6. Do you have your appointments for Chest Xray? 9/1                                                              Primary MD?  tomorrow                                                              Cardiologist?  9/1                                                              Dr. Liliam Coyle? 9/9                                                              Cardiac Rehab?  9/15    7. Do you have any other questions or concerns today?  no        Ale Gonzales  8/24/2020  1:36 PM

## 2020-08-25 NOTE — PROGRESS NOTES
Attempted to reach pt for TCM/schedule TCC. Pt states he is feeling well since d/c. He states he saw his PCP this morning for an HFU appt and will be seeing ENT tomorrow morning, then cardiology on 9/1.   Pt declined f/u with TCC as he already saw his PCP

## 2020-08-26 ENCOUNTER — ANTI-COAG (OUTPATIENT)
Dept: CARDIOLOGY | Age: 85
End: 2020-08-26

## 2020-08-26 DIAGNOSIS — Z79.01 LONG TERM (CURRENT) USE OF ANTICOAGULANTS: ICD-10-CM

## 2020-08-26 DIAGNOSIS — I26.99 PE (PULMONARY THROMBOEMBOLISM) (CMD): ICD-10-CM

## 2020-08-26 LAB — INR PPP: 2.3

## 2020-09-01 ENCOUNTER — OFFICE VISIT (OUTPATIENT)
Dept: CARDIOLOGY | Age: 85
End: 2020-09-01

## 2020-09-01 ENCOUNTER — HOSPITAL ENCOUNTER (OUTPATIENT)
Dept: GENERAL RADIOLOGY | Facility: HOSPITAL | Age: 85
Discharge: HOME | End: 2020-09-01
Attending: THORACIC SURGERY (CARDIOTHORACIC VASCULAR SURGERY)
Payer: MEDICARE

## 2020-09-01 VITALS
DIASTOLIC BLOOD PRESSURE: 68 MMHG | WEIGHT: 163 LBS | BODY MASS INDEX: 25.58 KG/M2 | HEIGHT: 67 IN | SYSTOLIC BLOOD PRESSURE: 90 MMHG | HEART RATE: 84 BPM

## 2020-09-01 DIAGNOSIS — Z95.2 PRESENCE OF PROSTHETIC HEART VALVE: Primary | ICD-10-CM

## 2020-09-01 DIAGNOSIS — I34.0 NONRHEUMATIC MITRAL VALVE REGURGITATION: ICD-10-CM

## 2020-09-01 DIAGNOSIS — J90 PLEURAL EFFUSION: ICD-10-CM

## 2020-09-01 DIAGNOSIS — Z79.01 LONG TERM (CURRENT) USE OF ANTICOAGULANTS: ICD-10-CM

## 2020-09-01 DIAGNOSIS — I26.99 PE (PULMONARY THROMBOEMBOLISM) (CMD): ICD-10-CM

## 2020-09-01 PROCEDURE — 71048 X-RAY EXAM CHEST 4+ VIEWS: CPT | Performed by: THORACIC SURGERY (CARDIOTHORACIC VASCULAR SURGERY)

## 2020-09-01 PROCEDURE — 99214 OFFICE O/P EST MOD 30 MIN: CPT | Performed by: CLINICAL NURSE SPECIALIST

## 2020-09-01 RX ORDER — WARFARIN SODIUM 2.5 MG/1
2.5 TABLET ORAL
COMMUNITY
Start: 2020-08-21 | End: 2020-09-24

## 2020-09-01 RX ORDER — TORSEMIDE 10 MG/1
10 TABLET ORAL
COMMUNITY
Start: 2020-08-10 | End: 2020-09-22 | Stop reason: SDUPTHER

## 2020-09-01 RX ORDER — ASPIRIN 325 MG
325 TABLET ORAL DAILY
COMMUNITY

## 2020-09-01 SDOH — HEALTH STABILITY: PHYSICAL HEALTH: ON AVERAGE, HOW MANY MINUTES DO YOU ENGAGE IN EXERCISE AT THIS LEVEL?: 50 MIN

## 2020-09-01 SDOH — HEALTH STABILITY: PHYSICAL HEALTH: ON AVERAGE, HOW MANY DAYS PER WEEK DO YOU ENGAGE IN MODERATE TO STRENUOUS EXERCISE (LIKE A BRISK WALK)?: 3 DAYS

## 2020-09-01 ASSESSMENT — ENCOUNTER SYMPTOMS
HEMATOCHEZIA: 0
WEIGHT LOSS: 0
ALLERGIC/IMMUNOLOGIC COMMENTS: NO NEW FOOD ALLERGIES
SUSPICIOUS LESIONS: 0
COUGH: 0
HEMOPTYSIS: 0
BRUISES/BLEEDS EASILY: 0
FEVER: 0
CHILLS: 0
WEIGHT GAIN: 0

## 2020-09-01 ASSESSMENT — PATIENT HEALTH QUESTIONNAIRE - PHQ9
1. LITTLE INTEREST OR PLEASURE IN DOING THINGS: NOT AT ALL
2. FEELING DOWN, DEPRESSED OR HOPELESS: NOT AT ALL
CLINICAL INTERPRETATION OF PHQ9 SCORE: NO FURTHER SCREENING NEEDED
SUM OF ALL RESPONSES TO PHQ9 QUESTIONS 1 AND 2: 0
CLINICAL INTERPRETATION OF PHQ2 SCORE: NO FURTHER SCREENING NEEDED
SUM OF ALL RESPONSES TO PHQ9 QUESTIONS 1 AND 2: 0

## 2020-09-02 ENCOUNTER — ANTI-COAG (OUTPATIENT)
Dept: CARDIOLOGY | Age: 85
End: 2020-09-02

## 2020-09-02 DIAGNOSIS — Z79.01 LONG TERM (CURRENT) USE OF ANTICOAGULANTS: ICD-10-CM

## 2020-09-02 DIAGNOSIS — I26.99 PE (PULMONARY THROMBOEMBOLISM) (CMD): ICD-10-CM

## 2020-09-02 LAB — INR PPP: 2

## 2020-09-04 ENCOUNTER — ANTI-COAG (OUTPATIENT)
Dept: CARDIOLOGY | Age: 85
End: 2020-09-04

## 2020-09-04 DIAGNOSIS — Z79.01 LONG TERM (CURRENT) USE OF ANTICOAGULANTS: ICD-10-CM

## 2020-09-04 DIAGNOSIS — I26.99 PE (PULMONARY THROMBOEMBOLISM) (CMD): ICD-10-CM

## 2020-09-04 LAB — INR PPP: 1.6

## 2020-09-08 ENCOUNTER — ANTI-COAG (OUTPATIENT)
Dept: CARDIOLOGY | Age: 85
End: 2020-09-08

## 2020-09-08 DIAGNOSIS — Z79.01 LONG TERM (CURRENT) USE OF ANTICOAGULANTS: ICD-10-CM

## 2020-09-08 DIAGNOSIS — I26.99 PE (PULMONARY THROMBOEMBOLISM) (CMD): ICD-10-CM

## 2020-09-08 LAB — INR PPP: 1.8

## 2020-09-14 ENCOUNTER — TELEPHONE (OUTPATIENT)
Dept: CARDIOLOGY | Age: 85
End: 2020-09-14

## 2020-09-14 DIAGNOSIS — Z79.01 LONG TERM (CURRENT) USE OF ANTICOAGULANTS: Primary | ICD-10-CM

## 2020-09-14 DIAGNOSIS — Z95.2 PRESENCE OF PROSTHETIC HEART VALVE: ICD-10-CM

## 2020-09-15 ENCOUNTER — CARDPULM VISIT (OUTPATIENT)
Dept: CARDIAC REHAB | Facility: HOSPITAL | Age: 85
End: 2020-09-15
Attending: INTERNAL MEDICINE
Payer: MEDICARE

## 2020-09-15 ENCOUNTER — HOSPITAL ENCOUNTER (OUTPATIENT)
Dept: LAB | Facility: HOSPITAL | Age: 85
Discharge: HOME | End: 2020-09-15
Attending: INTERNAL MEDICINE
Payer: MEDICARE

## 2020-09-15 ENCOUNTER — ANTI-COAG (OUTPATIENT)
Dept: CARDIOLOGY | Age: 85
End: 2020-09-15

## 2020-09-15 VITALS
OXYGEN SATURATION: 99 % | HEART RATE: 86 BPM | BODY MASS INDEX: 25.43 KG/M2 | WEIGHT: 162 LBS | HEIGHT: 67 IN | DIASTOLIC BLOOD PRESSURE: 60 MMHG | SYSTOLIC BLOOD PRESSURE: 108 MMHG

## 2020-09-15 DIAGNOSIS — Z79.01 LONG TERM (CURRENT) USE OF ANTICOAGULANTS: ICD-10-CM

## 2020-09-15 DIAGNOSIS — I26.99 PE (PULMONARY THROMBOEMBOLISM) (CMD): ICD-10-CM

## 2020-09-15 DIAGNOSIS — Z95.2 HEART VALVE REPLACED BY TRANSPLANT: ICD-10-CM

## 2020-09-15 LAB
INR PPP: 2.5
POC INR: 2.7 (ref 0.8–1.3)

## 2020-09-15 PROCEDURE — 93798 PHYS/QHP OP CAR RHAB W/ECG: CPT

## 2020-09-15 PROCEDURE — 85610 PROTHROMBIN TIME: CPT

## 2020-09-21 ENCOUNTER — CARDPULM VISIT (OUTPATIENT)
Dept: CARDIAC REHAB | Facility: HOSPITAL | Age: 85
End: 2020-09-21
Attending: INTERNAL MEDICINE
Payer: MEDICARE

## 2020-09-21 PROCEDURE — 93798 PHYS/QHP OP CAR RHAB W/ECG: CPT

## 2020-09-22 ENCOUNTER — TELEPHONE (OUTPATIENT)
Dept: CARDIOLOGY | Age: 85
End: 2020-09-22

## 2020-09-23 ENCOUNTER — CARDPULM VISIT (OUTPATIENT)
Dept: CARDIAC REHAB | Facility: HOSPITAL | Age: 85
End: 2020-09-23
Attending: INTERNAL MEDICINE
Payer: MEDICARE

## 2020-09-23 PROCEDURE — 93798 PHYS/QHP OP CAR RHAB W/ECG: CPT

## 2020-09-23 RX ORDER — TORSEMIDE 10 MG/1
10 TABLET ORAL DAILY
Qty: 90 TABLET | Refills: 0 | Status: SHIPPED | OUTPATIENT
Start: 2020-09-23 | End: 2020-09-25 | Stop reason: ALTCHOICE

## 2020-09-24 RX ORDER — WARFARIN SODIUM 2.5 MG/1
TABLET ORAL
Qty: 180 TABLET | Refills: 0 | Status: SHIPPED | OUTPATIENT
Start: 2020-09-24 | End: 2020-12-28 | Stop reason: SDUPTHER

## 2020-09-25 ENCOUNTER — HOSPITAL ENCOUNTER (OUTPATIENT)
Dept: LAB | Facility: HOSPITAL | Age: 85
Discharge: HOME | End: 2020-09-25
Attending: INTERNAL MEDICINE
Payer: MEDICARE

## 2020-09-25 ENCOUNTER — OFFICE VISIT (OUTPATIENT)
Dept: CARDIOLOGY | Age: 85
End: 2020-09-25

## 2020-09-25 ENCOUNTER — ANTI-COAG (OUTPATIENT)
Dept: CARDIOLOGY | Age: 85
End: 2020-09-25

## 2020-09-25 ENCOUNTER — CARDPULM VISIT (OUTPATIENT)
Dept: CARDIAC REHAB | Facility: HOSPITAL | Age: 85
End: 2020-09-25
Attending: INTERNAL MEDICINE
Payer: MEDICARE

## 2020-09-25 VITALS
HEIGHT: 67 IN | BODY MASS INDEX: 25.58 KG/M2 | WEIGHT: 163 LBS | DIASTOLIC BLOOD PRESSURE: 60 MMHG | HEART RATE: 80 BPM | SYSTOLIC BLOOD PRESSURE: 94 MMHG

## 2020-09-25 DIAGNOSIS — Z79.01 LONG TERM (CURRENT) USE OF ANTICOAGULANTS: ICD-10-CM

## 2020-09-25 DIAGNOSIS — Z95.2 PRESENCE OF PROSTHETIC HEART VALVE: ICD-10-CM

## 2020-09-25 DIAGNOSIS — I25.10 CORONARY ARTERY DISEASE INVOLVING NATIVE CORONARY ARTERY OF NATIVE HEART WITHOUT ANGINA PECTORIS: Primary | ICD-10-CM

## 2020-09-25 DIAGNOSIS — I26.99 PE (PULMONARY THROMBOEMBOLISM) (CMD): ICD-10-CM

## 2020-09-25 DIAGNOSIS — I34.0 NONRHEUMATIC MITRAL VALVE REGURGITATION: ICD-10-CM

## 2020-09-25 DIAGNOSIS — Z95.2 HEART VALVE REPLACED BY TRANSPLANT: ICD-10-CM

## 2020-09-25 DIAGNOSIS — E78.00 PURE HYPERCHOLESTEROLEMIA: ICD-10-CM

## 2020-09-25 LAB
INR PPP: 2
POC INR: 2 (ref 0.8–1.3)

## 2020-09-25 PROCEDURE — 93798 PHYS/QHP OP CAR RHAB W/ECG: CPT

## 2020-09-25 PROCEDURE — 85610 PROTHROMBIN TIME: CPT

## 2020-09-25 PROCEDURE — 99214 OFFICE O/P EST MOD 30 MIN: CPT | Performed by: CLINICAL NURSE SPECIALIST

## 2020-09-25 RX ORDER — AMOXICILLIN 500 MG/1
2000 TABLET, FILM COATED ORAL ONCE
Qty: 4 TABLET | Refills: 3 | Status: SHIPPED | OUTPATIENT
Start: 2020-09-25 | End: 2020-09-25

## 2020-09-25 ASSESSMENT — ENCOUNTER SYMPTOMS
SUSPICIOUS LESIONS: 0
BRUISES/BLEEDS EASILY: 0
COUGH: 0
FEVER: 0
HEMOPTYSIS: 0
HEMATOCHEZIA: 0
ALLERGIC/IMMUNOLOGIC COMMENTS: NO NEW FOOD ALLERGIES
WEIGHT LOSS: 0
WEIGHT GAIN: 0
CHILLS: 0

## 2020-09-25 ASSESSMENT — PATIENT HEALTH QUESTIONNAIRE - PHQ9
SUM OF ALL RESPONSES TO PHQ9 QUESTIONS 1 AND 2: 0
CLINICAL INTERPRETATION OF PHQ9 SCORE: NO FURTHER SCREENING NEEDED
SUM OF ALL RESPONSES TO PHQ9 QUESTIONS 1 AND 2: 0
CLINICAL INTERPRETATION OF PHQ2 SCORE: NO FURTHER SCREENING NEEDED
2. FEELING DOWN, DEPRESSED OR HOPELESS: NOT AT ALL
1. LITTLE INTEREST OR PLEASURE IN DOING THINGS: NOT AT ALL

## 2020-09-28 ENCOUNTER — CARDPULM VISIT (OUTPATIENT)
Dept: CARDIAC REHAB | Facility: HOSPITAL | Age: 85
End: 2020-09-28
Attending: INTERNAL MEDICINE
Payer: MEDICARE

## 2020-09-28 PROCEDURE — 93798 PHYS/QHP OP CAR RHAB W/ECG: CPT

## 2020-09-30 ENCOUNTER — CARDPULM VISIT (OUTPATIENT)
Dept: CARDIAC REHAB | Facility: HOSPITAL | Age: 85
End: 2020-09-30
Attending: INTERNAL MEDICINE
Payer: MEDICARE

## 2020-09-30 PROCEDURE — 93798 PHYS/QHP OP CAR RHAB W/ECG: CPT

## 2020-10-02 ENCOUNTER — CARDPULM VISIT (OUTPATIENT)
Dept: CARDIAC REHAB | Facility: HOSPITAL | Age: 85
End: 2020-10-02
Attending: INTERNAL MEDICINE
Payer: MEDICARE

## 2020-10-02 PROCEDURE — 93798 PHYS/QHP OP CAR RHAB W/ECG: CPT

## 2020-10-05 ENCOUNTER — CARDPULM VISIT (OUTPATIENT)
Dept: CARDIAC REHAB | Facility: HOSPITAL | Age: 85
End: 2020-10-05
Attending: INTERNAL MEDICINE
Payer: MEDICARE

## 2020-10-05 PROCEDURE — 93798 PHYS/QHP OP CAR RHAB W/ECG: CPT

## 2020-10-05 NOTE — PROGRESS NOTES
I saw this patient in the hospital for endocrine consult for glucoses. I ordered the thyroid tests as well. The thyroglobulin (marker for thyroid tissue) was undetectable, which is good, showing no thyroid tissue.      However, this should be monitored

## 2020-10-07 ENCOUNTER — CARDPULM VISIT (OUTPATIENT)
Dept: CARDIAC REHAB | Facility: HOSPITAL | Age: 85
End: 2020-10-07
Attending: INTERNAL MEDICINE
Payer: MEDICARE

## 2020-10-07 PROCEDURE — 93798 PHYS/QHP OP CAR RHAB W/ECG: CPT

## 2020-10-09 ENCOUNTER — ANTI-COAG (OUTPATIENT)
Dept: CARDIOLOGY | Age: 85
End: 2020-10-09

## 2020-10-09 ENCOUNTER — HOSPITAL ENCOUNTER (OUTPATIENT)
Dept: LAB | Facility: HOSPITAL | Age: 85
Discharge: HOME | End: 2020-10-09
Attending: INTERNAL MEDICINE
Payer: MEDICARE

## 2020-10-09 ENCOUNTER — CARDPULM VISIT (OUTPATIENT)
Dept: CARDIAC REHAB | Facility: HOSPITAL | Age: 85
End: 2020-10-09
Attending: INTERNAL MEDICINE
Payer: MEDICARE

## 2020-10-09 DIAGNOSIS — Z95.2 HEART VALVE REPLACED BY TRANSPLANT: ICD-10-CM

## 2020-10-09 DIAGNOSIS — I26.99 PE (PULMONARY THROMBOEMBOLISM) (CMD): ICD-10-CM

## 2020-10-09 DIAGNOSIS — Z79.01 LONG TERM (CURRENT) USE OF ANTICOAGULANTS: ICD-10-CM

## 2020-10-09 LAB — INR PPP: 1.7

## 2020-10-09 PROCEDURE — 93798 PHYS/QHP OP CAR RHAB W/ECG: CPT

## 2020-10-09 PROCEDURE — 85610 PROTHROMBIN TIME: CPT

## 2020-10-12 ENCOUNTER — CARDPULM VISIT (OUTPATIENT)
Dept: CARDIAC REHAB | Facility: HOSPITAL | Age: 85
End: 2020-10-12
Attending: INTERNAL MEDICINE
Payer: MEDICARE

## 2020-10-12 PROCEDURE — 93798 PHYS/QHP OP CAR RHAB W/ECG: CPT

## 2020-10-14 ENCOUNTER — APPOINTMENT (OUTPATIENT)
Dept: CARDIAC REHAB | Facility: HOSPITAL | Age: 85
End: 2020-10-14
Payer: MEDICARE

## 2020-10-16 ENCOUNTER — CARDPULM VISIT (OUTPATIENT)
Dept: CARDIAC REHAB | Facility: HOSPITAL | Age: 85
End: 2020-10-16
Attending: INTERNAL MEDICINE
Payer: MEDICARE

## 2020-10-16 PROCEDURE — 93798 PHYS/QHP OP CAR RHAB W/ECG: CPT

## 2020-10-17 ENCOUNTER — HOSPITAL ENCOUNTER (OUTPATIENT)
Dept: LAB | Facility: HOSPITAL | Age: 85
Discharge: HOME | End: 2020-10-17
Attending: INTERNAL MEDICINE
Payer: MEDICARE

## 2020-10-17 DIAGNOSIS — Z95.2 HEART VALVE REPLACED BY TRANSPLANT: ICD-10-CM

## 2020-10-17 DIAGNOSIS — Z79.01 LONG TERM (CURRENT) USE OF ANTICOAGULANTS: ICD-10-CM

## 2020-10-17 LAB — INR PPP: 1.7

## 2020-10-17 PROCEDURE — 85610 PROTHROMBIN TIME: CPT

## 2020-10-19 ENCOUNTER — CARDPULM VISIT (OUTPATIENT)
Dept: CARDIAC REHAB | Facility: HOSPITAL | Age: 85
End: 2020-10-19
Attending: INTERNAL MEDICINE
Payer: MEDICARE

## 2020-10-19 ENCOUNTER — ANTI-COAG (OUTPATIENT)
Dept: CARDIOLOGY | Age: 85
End: 2020-10-19

## 2020-10-19 DIAGNOSIS — Z79.01 LONG TERM (CURRENT) USE OF ANTICOAGULANTS: ICD-10-CM

## 2020-10-19 DIAGNOSIS — I26.99 PE (PULMONARY THROMBOEMBOLISM) (CMD): ICD-10-CM

## 2020-10-19 PROCEDURE — 93798 PHYS/QHP OP CAR RHAB W/ECG: CPT

## 2020-10-21 ENCOUNTER — CARDPULM VISIT (OUTPATIENT)
Dept: CARDIAC REHAB | Facility: HOSPITAL | Age: 85
End: 2020-10-21
Attending: INTERNAL MEDICINE
Payer: MEDICARE

## 2020-10-21 PROCEDURE — 93798 PHYS/QHP OP CAR RHAB W/ECG: CPT

## 2020-10-23 ENCOUNTER — CARDPULM VISIT (OUTPATIENT)
Dept: CARDIAC REHAB | Facility: HOSPITAL | Age: 85
End: 2020-10-23
Attending: INTERNAL MEDICINE
Payer: MEDICARE

## 2020-10-23 PROCEDURE — 93798 PHYS/QHP OP CAR RHAB W/ECG: CPT

## 2020-10-26 ENCOUNTER — HOSPITAL ENCOUNTER (OUTPATIENT)
Dept: LAB | Facility: HOSPITAL | Age: 85
Discharge: HOME | End: 2020-10-26
Attending: INTERNAL MEDICINE
Payer: MEDICARE

## 2020-10-26 ENCOUNTER — ANTI-COAG (OUTPATIENT)
Dept: CARDIOLOGY | Age: 85
End: 2020-10-26

## 2020-10-26 ENCOUNTER — CARDPULM VISIT (OUTPATIENT)
Dept: CARDIAC REHAB | Facility: HOSPITAL | Age: 85
End: 2020-10-26
Attending: INTERNAL MEDICINE
Payer: MEDICARE

## 2020-10-26 DIAGNOSIS — I26.99 PE (PULMONARY THROMBOEMBOLISM) (CMD): ICD-10-CM

## 2020-10-26 DIAGNOSIS — Z95.2 HEART VALVE REPLACED BY TRANSPLANT: ICD-10-CM

## 2020-10-26 DIAGNOSIS — Z79.01 LONG TERM (CURRENT) USE OF ANTICOAGULANTS: ICD-10-CM

## 2020-10-26 LAB — INR PPP: 2.4

## 2020-10-26 PROCEDURE — 93798 PHYS/QHP OP CAR RHAB W/ECG: CPT

## 2020-10-26 PROCEDURE — 85610 PROTHROMBIN TIME: CPT

## 2020-10-28 ENCOUNTER — CARDPULM VISIT (OUTPATIENT)
Dept: CARDIAC REHAB | Facility: HOSPITAL | Age: 85
End: 2020-10-28
Attending: INTERNAL MEDICINE
Payer: MEDICARE

## 2020-10-28 PROCEDURE — 93798 PHYS/QHP OP CAR RHAB W/ECG: CPT

## 2020-10-30 ENCOUNTER — CARDPULM VISIT (OUTPATIENT)
Dept: CARDIAC REHAB | Facility: HOSPITAL | Age: 85
End: 2020-10-30
Attending: INTERNAL MEDICINE
Payer: MEDICARE

## 2020-10-30 PROCEDURE — 93798 PHYS/QHP OP CAR RHAB W/ECG: CPT

## 2020-11-02 ENCOUNTER — HOSPITAL ENCOUNTER (OUTPATIENT)
Dept: LAB | Facility: HOSPITAL | Age: 85
Discharge: HOME | End: 2020-11-02
Attending: INTERNAL MEDICINE
Payer: MEDICARE

## 2020-11-02 ENCOUNTER — ANTI-COAG (OUTPATIENT)
Dept: CARDIOLOGY | Age: 85
End: 2020-11-02

## 2020-11-02 ENCOUNTER — CARDPULM VISIT (OUTPATIENT)
Dept: CARDIAC REHAB | Facility: HOSPITAL | Age: 85
End: 2020-11-02
Attending: INTERNAL MEDICINE
Payer: MEDICARE

## 2020-11-02 DIAGNOSIS — I26.99 PE (PULMONARY THROMBOEMBOLISM) (CMD): ICD-10-CM

## 2020-11-02 DIAGNOSIS — Z79.01 LONG TERM (CURRENT) USE OF ANTICOAGULANTS: ICD-10-CM

## 2020-11-02 DIAGNOSIS — Z95.2 HEART VALVE REPLACED BY TRANSPLANT: ICD-10-CM

## 2020-11-02 LAB — INR PPP: 2.4

## 2020-11-02 PROCEDURE — 85610 PROTHROMBIN TIME: CPT

## 2020-11-02 PROCEDURE — 93798 PHYS/QHP OP CAR RHAB W/ECG: CPT

## 2020-11-04 ENCOUNTER — CARDPULM VISIT (OUTPATIENT)
Dept: CARDIAC REHAB | Facility: HOSPITAL | Age: 85
End: 2020-11-04
Attending: INTERNAL MEDICINE
Payer: MEDICARE

## 2020-11-04 PROCEDURE — 93798 PHYS/QHP OP CAR RHAB W/ECG: CPT

## 2020-11-06 ENCOUNTER — CARDPULM VISIT (OUTPATIENT)
Dept: CARDIAC REHAB | Facility: HOSPITAL | Age: 85
End: 2020-11-06
Attending: INTERNAL MEDICINE
Payer: MEDICARE

## 2020-11-06 PROCEDURE — 93798 PHYS/QHP OP CAR RHAB W/ECG: CPT

## 2020-11-09 ENCOUNTER — CARDPULM VISIT (OUTPATIENT)
Dept: CARDIAC REHAB | Facility: HOSPITAL | Age: 85
End: 2020-11-09
Attending: INTERNAL MEDICINE
Payer: MEDICARE

## 2020-11-09 PROCEDURE — 93798 PHYS/QHP OP CAR RHAB W/ECG: CPT

## 2020-11-11 ENCOUNTER — CARDPULM VISIT (OUTPATIENT)
Dept: CARDIAC REHAB | Facility: HOSPITAL | Age: 85
End: 2020-11-11
Attending: INTERNAL MEDICINE
Payer: MEDICARE

## 2020-11-11 PROCEDURE — 93798 PHYS/QHP OP CAR RHAB W/ECG: CPT

## 2020-11-13 ENCOUNTER — CARDPULM VISIT (OUTPATIENT)
Dept: CARDIAC REHAB | Facility: HOSPITAL | Age: 85
End: 2020-11-13
Attending: INTERNAL MEDICINE
Payer: MEDICARE

## 2020-11-13 PROCEDURE — 93798 PHYS/QHP OP CAR RHAB W/ECG: CPT

## 2020-11-16 ENCOUNTER — ANTI-COAG (OUTPATIENT)
Dept: CARDIOLOGY | Age: 85
End: 2020-11-16

## 2020-11-16 ENCOUNTER — HOSPITAL ENCOUNTER (OUTPATIENT)
Dept: LAB | Facility: HOSPITAL | Age: 85
Discharge: HOME | End: 2020-11-16
Attending: INTERNAL MEDICINE
Payer: MEDICARE

## 2020-11-16 ENCOUNTER — APPOINTMENT (OUTPATIENT)
Dept: CARDIAC REHAB | Facility: HOSPITAL | Age: 85
End: 2020-11-16
Payer: MEDICARE

## 2020-11-16 DIAGNOSIS — I26.99 PE (PULMONARY THROMBOEMBOLISM) (CMD): ICD-10-CM

## 2020-11-16 DIAGNOSIS — Z79.01 LONG TERM (CURRENT) USE OF ANTICOAGULANTS: ICD-10-CM

## 2020-11-16 DIAGNOSIS — Z95.2 HEART VALVE REPLACED BY TRANSPLANT: ICD-10-CM

## 2020-11-16 LAB — INR PPP: 2.4

## 2020-11-16 PROCEDURE — 85610 PROTHROMBIN TIME: CPT

## 2020-11-18 ENCOUNTER — APPOINTMENT (OUTPATIENT)
Dept: CARDIAC REHAB | Facility: HOSPITAL | Age: 85
End: 2020-11-18
Payer: MEDICARE

## 2020-11-20 ENCOUNTER — APPOINTMENT (OUTPATIENT)
Dept: CARDIAC REHAB | Facility: HOSPITAL | Age: 85
End: 2020-11-20
Payer: MEDICARE

## 2020-11-23 ENCOUNTER — APPOINTMENT (OUTPATIENT)
Dept: CARDIAC REHAB | Facility: HOSPITAL | Age: 85
End: 2020-11-23
Payer: MEDICARE

## 2020-11-25 ENCOUNTER — APPOINTMENT (OUTPATIENT)
Dept: CARDIAC REHAB | Facility: HOSPITAL | Age: 85
End: 2020-11-25
Payer: MEDICARE

## 2020-11-27 ENCOUNTER — APPOINTMENT (OUTPATIENT)
Dept: CARDIAC REHAB | Facility: HOSPITAL | Age: 85
End: 2020-11-27
Payer: MEDICARE

## 2020-11-30 ENCOUNTER — APPOINTMENT (OUTPATIENT)
Dept: CARDIAC REHAB | Facility: HOSPITAL | Age: 85
End: 2020-11-30
Payer: MEDICARE

## 2020-11-30 ENCOUNTER — HOSPITAL ENCOUNTER (OUTPATIENT)
Dept: LAB | Facility: HOSPITAL | Age: 85
Discharge: HOME | End: 2020-11-30
Attending: INTERNAL MEDICINE
Payer: MEDICARE

## 2020-11-30 ENCOUNTER — ANTI-COAG (OUTPATIENT)
Dept: CARDIOLOGY | Age: 85
End: 2020-11-30

## 2020-11-30 DIAGNOSIS — Z95.2 HEART VALVE REPLACED BY TRANSPLANT: ICD-10-CM

## 2020-11-30 DIAGNOSIS — Z79.01 LONG TERM (CURRENT) USE OF ANTICOAGULANTS: ICD-10-CM

## 2020-11-30 DIAGNOSIS — I26.99 PE (PULMONARY THROMBOEMBOLISM) (CMD): ICD-10-CM

## 2020-11-30 LAB — INR PPP: 2.3

## 2020-11-30 PROCEDURE — 85610 PROTHROMBIN TIME: CPT

## 2020-12-02 ENCOUNTER — APPOINTMENT (OUTPATIENT)
Dept: CARDIAC REHAB | Facility: HOSPITAL | Age: 85
End: 2020-12-02
Payer: MEDICARE

## 2020-12-04 ENCOUNTER — APPOINTMENT (OUTPATIENT)
Dept: CARDIAC REHAB | Facility: HOSPITAL | Age: 85
End: 2020-12-04
Payer: MEDICARE

## 2020-12-07 ENCOUNTER — APPOINTMENT (OUTPATIENT)
Dept: CARDIAC REHAB | Facility: HOSPITAL | Age: 85
End: 2020-12-07
Payer: MEDICARE

## 2020-12-09 ENCOUNTER — APPOINTMENT (OUTPATIENT)
Dept: CARDIAC REHAB | Facility: HOSPITAL | Age: 85
End: 2020-12-09
Payer: MEDICARE

## 2020-12-18 ENCOUNTER — TELEPHONE (OUTPATIENT)
Dept: CARDIOLOGY | Age: 85
End: 2020-12-18

## 2020-12-28 ENCOUNTER — HOSPITAL ENCOUNTER (OUTPATIENT)
Dept: LAB | Facility: HOSPITAL | Age: 85
Discharge: HOME | End: 2020-12-28
Attending: INTERNAL MEDICINE
Payer: MEDICARE

## 2020-12-28 ENCOUNTER — TELEPHONE (OUTPATIENT)
Dept: CARDIOLOGY | Age: 85
End: 2020-12-28

## 2020-12-28 ENCOUNTER — ANTI-COAG (OUTPATIENT)
Dept: CARDIOLOGY | Age: 85
End: 2020-12-28

## 2020-12-28 DIAGNOSIS — I26.99 PE (PULMONARY THROMBOEMBOLISM) (CMD): ICD-10-CM

## 2020-12-28 DIAGNOSIS — Z79.01 LONG TERM (CURRENT) USE OF ANTICOAGULANTS: ICD-10-CM

## 2020-12-28 DIAGNOSIS — Z95.2 HEART VALVE REPLACED BY TRANSPLANT: ICD-10-CM

## 2020-12-28 LAB — INR PPP: 2.3

## 2020-12-28 PROCEDURE — 85610 PROTHROMBIN TIME: CPT

## 2020-12-28 RX ORDER — WARFARIN SODIUM 2.5 MG/1
TABLET ORAL
Qty: 180 TABLET | Refills: 3 | Status: SHIPPED | OUTPATIENT
Start: 2020-12-28 | End: 2021-03-19 | Stop reason: SDUPTHER

## 2021-02-01 ENCOUNTER — ANTI-COAG (OUTPATIENT)
Dept: CARDIOLOGY | Age: 86
End: 2021-02-01

## 2021-02-01 ENCOUNTER — CARDPULM VISIT (OUTPATIENT)
Dept: CARDIAC REHAB | Facility: HOSPITAL | Age: 86
End: 2021-02-01
Attending: INTERNAL MEDICINE
Payer: MEDICARE

## 2021-02-01 ENCOUNTER — HOSPITAL ENCOUNTER (OUTPATIENT)
Dept: LAB | Facility: HOSPITAL | Age: 86
Discharge: HOME | End: 2021-02-01
Attending: INTERNAL MEDICINE
Payer: MEDICARE

## 2021-02-01 DIAGNOSIS — I26.99 PE (PULMONARY THROMBOEMBOLISM) (CMD): ICD-10-CM

## 2021-02-01 DIAGNOSIS — Z79.01 LONG TERM (CURRENT) USE OF ANTICOAGULANTS: ICD-10-CM

## 2021-02-01 DIAGNOSIS — Z95.2 HEART VALVE REPLACED BY TRANSPLANT: ICD-10-CM

## 2021-02-01 LAB
INR PPP: 2
POC INR: 2 (ref 0.8–1.3)

## 2021-02-01 PROCEDURE — 93798 PHYS/QHP OP CAR RHAB W/ECG: CPT

## 2021-02-01 PROCEDURE — 85610 PROTHROMBIN TIME: CPT

## 2021-02-03 ENCOUNTER — CARDPULM VISIT (OUTPATIENT)
Dept: CARDIAC REHAB | Facility: HOSPITAL | Age: 86
End: 2021-02-03
Attending: INTERNAL MEDICINE
Payer: MEDICARE

## 2021-02-03 PROCEDURE — 93798 PHYS/QHP OP CAR RHAB W/ECG: CPT

## 2021-02-05 ENCOUNTER — CARDPULM VISIT (OUTPATIENT)
Dept: CARDIAC REHAB | Facility: HOSPITAL | Age: 86
End: 2021-02-05
Attending: INTERNAL MEDICINE
Payer: MEDICARE

## 2021-02-05 PROCEDURE — 93798 PHYS/QHP OP CAR RHAB W/ECG: CPT

## 2021-02-08 ENCOUNTER — CARDPULM VISIT (OUTPATIENT)
Dept: CARDIAC REHAB | Facility: HOSPITAL | Age: 86
End: 2021-02-08
Attending: INTERNAL MEDICINE
Payer: MEDICARE

## 2021-02-08 PROCEDURE — 93798 PHYS/QHP OP CAR RHAB W/ECG: CPT

## 2021-02-10 ENCOUNTER — CARDPULM VISIT (OUTPATIENT)
Dept: CARDIAC REHAB | Facility: HOSPITAL | Age: 86
End: 2021-02-10
Attending: INTERNAL MEDICINE
Payer: MEDICARE

## 2021-02-10 PROCEDURE — 93798 PHYS/QHP OP CAR RHAB W/ECG: CPT

## 2021-02-12 ENCOUNTER — CARDPULM VISIT (OUTPATIENT)
Dept: CARDIAC REHAB | Facility: HOSPITAL | Age: 86
End: 2021-02-12
Attending: INTERNAL MEDICINE
Payer: MEDICARE

## 2021-02-12 PROCEDURE — 93798 PHYS/QHP OP CAR RHAB W/ECG: CPT

## 2021-02-15 ENCOUNTER — CARDPULM VISIT (OUTPATIENT)
Dept: CARDIAC REHAB | Facility: HOSPITAL | Age: 86
End: 2021-02-15
Attending: INTERNAL MEDICINE
Payer: MEDICARE

## 2021-02-15 PROCEDURE — 93798 PHYS/QHP OP CAR RHAB W/ECG: CPT

## 2021-02-17 ENCOUNTER — CARDPULM VISIT (OUTPATIENT)
Dept: CARDIAC REHAB | Facility: HOSPITAL | Age: 86
End: 2021-02-17
Attending: INTERNAL MEDICINE
Payer: MEDICARE

## 2021-02-17 PROCEDURE — 93798 PHYS/QHP OP CAR RHAB W/ECG: CPT

## 2021-02-19 ENCOUNTER — CARDPULM VISIT (OUTPATIENT)
Dept: CARDIAC REHAB | Facility: HOSPITAL | Age: 86
End: 2021-02-19
Attending: INTERNAL MEDICINE
Payer: MEDICARE

## 2021-02-19 PROCEDURE — 93798 PHYS/QHP OP CAR RHAB W/ECG: CPT

## 2021-02-22 ENCOUNTER — CARDPULM VISIT (OUTPATIENT)
Dept: CARDIAC REHAB | Facility: HOSPITAL | Age: 86
End: 2021-02-22
Attending: INTERNAL MEDICINE
Payer: MEDICARE

## 2021-02-22 PROCEDURE — 93798 PHYS/QHP OP CAR RHAB W/ECG: CPT

## 2021-02-24 ENCOUNTER — CARDPULM VISIT (OUTPATIENT)
Dept: CARDIAC REHAB | Facility: HOSPITAL | Age: 86
End: 2021-02-24
Attending: INTERNAL MEDICINE
Payer: MEDICARE

## 2021-02-24 PROCEDURE — 93798 PHYS/QHP OP CAR RHAB W/ECG: CPT

## 2021-02-26 ENCOUNTER — CARDPULM VISIT (OUTPATIENT)
Dept: CARDIAC REHAB | Facility: HOSPITAL | Age: 86
End: 2021-02-26
Attending: INTERNAL MEDICINE
Payer: MEDICARE

## 2021-02-26 PROCEDURE — 93798 PHYS/QHP OP CAR RHAB W/ECG: CPT

## 2021-03-01 ENCOUNTER — HOSPITAL ENCOUNTER (OUTPATIENT)
Dept: LAB | Facility: HOSPITAL | Age: 86
Discharge: HOME | End: 2021-03-01
Attending: INTERNAL MEDICINE
Payer: MEDICARE

## 2021-03-01 ENCOUNTER — ANTI-COAG (OUTPATIENT)
Dept: CARDIOLOGY | Age: 86
End: 2021-03-01

## 2021-03-01 ENCOUNTER — APPOINTMENT (OUTPATIENT)
Dept: CARDIAC REHAB | Facility: HOSPITAL | Age: 86
End: 2021-03-01
Attending: INTERNAL MEDICINE
Payer: MEDICARE

## 2021-03-01 DIAGNOSIS — I26.99 PE (PULMONARY THROMBOEMBOLISM) (CMD): ICD-10-CM

## 2021-03-01 DIAGNOSIS — Z79.01 LONG TERM (CURRENT) USE OF ANTICOAGULANTS: ICD-10-CM

## 2021-03-01 DIAGNOSIS — Z95.2 HEART VALVE REPLACED BY TRANSPLANT: ICD-10-CM

## 2021-03-01 LAB
INR PPP: 1.9
POC INR: 1.9 (ref 0.8–1.3)

## 2021-03-01 PROCEDURE — 85610 PROTHROMBIN TIME: CPT

## 2021-03-19 ENCOUNTER — TELEPHONE (OUTPATIENT)
Dept: CARDIOLOGY | Age: 86
End: 2021-03-19

## 2021-03-19 RX ORDER — WARFARIN SODIUM 2.5 MG/1
TABLET ORAL
Qty: 180 TABLET | Refills: 3 | Status: SHIPPED | OUTPATIENT
Start: 2021-03-19 | End: 2021-06-04 | Stop reason: ALTCHOICE

## 2021-03-22 ENCOUNTER — HOSPITAL ENCOUNTER (OUTPATIENT)
Dept: LAB | Facility: HOSPITAL | Age: 86
Discharge: HOME | End: 2021-03-22
Attending: INTERNAL MEDICINE
Payer: MEDICARE

## 2021-03-22 ENCOUNTER — ANTI-COAG (OUTPATIENT)
Dept: CARDIOLOGY | Age: 86
End: 2021-03-22

## 2021-03-22 DIAGNOSIS — Z95.2 HEART VALVE REPLACED BY TRANSPLANT: ICD-10-CM

## 2021-03-22 DIAGNOSIS — Z79.01 LONG TERM (CURRENT) USE OF ANTICOAGULANTS: ICD-10-CM

## 2021-03-22 DIAGNOSIS — I26.99 PE (PULMONARY THROMBOEMBOLISM) (CMD): ICD-10-CM

## 2021-03-22 LAB
INR PPP: 1.9
POC INR: 1.9 (ref 0.8–1.3)

## 2021-03-22 PROCEDURE — 85610 PROTHROMBIN TIME: CPT

## 2021-03-23 ENCOUNTER — APPOINTMENT (OUTPATIENT)
Dept: CARDIOLOGY | Age: 86
End: 2021-03-23

## 2021-04-05 ENCOUNTER — HOSPITAL ENCOUNTER (OUTPATIENT)
Dept: LAB | Facility: HOSPITAL | Age: 86
Discharge: HOME | End: 2021-04-05
Attending: INTERNAL MEDICINE
Payer: MEDICARE

## 2021-04-05 ENCOUNTER — ANTI-COAG (OUTPATIENT)
Dept: CARDIOLOGY | Age: 86
End: 2021-04-05

## 2021-04-05 DIAGNOSIS — I26.99 PE (PULMONARY THROMBOEMBOLISM) (CMD): ICD-10-CM

## 2021-04-05 DIAGNOSIS — Z95.2 HEART VALVE REPLACED BY TRANSPLANT: ICD-10-CM

## 2021-04-05 DIAGNOSIS — Z79.01 LONG TERM (CURRENT) USE OF ANTICOAGULANTS: ICD-10-CM

## 2021-04-05 LAB — INR PPP: 2.4

## 2021-04-05 PROCEDURE — 85610 PROTHROMBIN TIME: CPT

## 2021-04-20 ENCOUNTER — ANTI-COAG (OUTPATIENT)
Dept: CARDIOLOGY | Age: 86
End: 2021-04-20

## 2021-04-20 ENCOUNTER — HOSPITAL ENCOUNTER (OUTPATIENT)
Dept: LAB | Facility: HOSPITAL | Age: 86
Discharge: HOME | End: 2021-04-20
Attending: INTERNAL MEDICINE
Payer: MEDICARE

## 2021-04-20 DIAGNOSIS — I26.99 PE (PULMONARY THROMBOEMBOLISM) (CMD): ICD-10-CM

## 2021-04-20 DIAGNOSIS — Z79.01 LONG TERM (CURRENT) USE OF ANTICOAGULANTS: ICD-10-CM

## 2021-04-20 DIAGNOSIS — Z95.2 HEART VALVE REPLACED BY TRANSPLANT: ICD-10-CM

## 2021-04-20 LAB — INR PPP: 2.9

## 2021-04-20 PROCEDURE — 85610 PROTHROMBIN TIME: CPT

## 2021-05-20 ENCOUNTER — HOSPITAL ENCOUNTER (OUTPATIENT)
Dept: LAB | Facility: HOSPITAL | Age: 86
Discharge: HOME | End: 2021-05-20
Attending: INTERNAL MEDICINE
Payer: MEDICARE

## 2021-05-20 ENCOUNTER — TELEPHONE (OUTPATIENT)
Dept: CARDIOLOGY | Age: 86
End: 2021-05-20

## 2021-05-20 DIAGNOSIS — Z79.01 LONG TERM (CURRENT) USE OF ANTICOAGULANTS: ICD-10-CM

## 2021-05-20 DIAGNOSIS — Z95.2 HEART VALVE REPLACED BY TRANSPLANT: ICD-10-CM

## 2021-05-20 DIAGNOSIS — I26.99 PE (PULMONARY THROMBOEMBOLISM) (CMD): ICD-10-CM

## 2021-05-20 LAB — INR PPP: 2.1

## 2021-05-20 PROCEDURE — 85610 PROTHROMBIN TIME: CPT

## 2021-05-25 ENCOUNTER — OFFICE VISIT (OUTPATIENT)
Dept: CARDIOLOGY | Age: 86
End: 2021-05-25

## 2021-05-25 VITALS
DIASTOLIC BLOOD PRESSURE: 62 MMHG | SYSTOLIC BLOOD PRESSURE: 98 MMHG | BODY MASS INDEX: 26.06 KG/M2 | WEIGHT: 166 LBS | HEIGHT: 67 IN | HEART RATE: 68 BPM

## 2021-05-25 DIAGNOSIS — E78.00 PURE HYPERCHOLESTEROLEMIA: ICD-10-CM

## 2021-05-25 DIAGNOSIS — I25.10 CORONARY ARTERY DISEASE INVOLVING NATIVE CORONARY ARTERY OF NATIVE HEART WITHOUT ANGINA PECTORIS: ICD-10-CM

## 2021-05-25 DIAGNOSIS — I34.0 NONRHEUMATIC MITRAL VALVE REGURGITATION: ICD-10-CM

## 2021-05-25 DIAGNOSIS — I26.99 PE (PULMONARY THROMBOEMBOLISM) (CMD): ICD-10-CM

## 2021-05-25 DIAGNOSIS — Z95.2 PRESENCE OF PROSTHETIC HEART VALVE: Primary | ICD-10-CM

## 2021-05-25 DIAGNOSIS — Z79.01 LONG TERM (CURRENT) USE OF ANTICOAGULANTS: ICD-10-CM

## 2021-05-25 DIAGNOSIS — I25.10 CORONARY ARTERY CALCIFICATION SEEN ON CT SCAN: ICD-10-CM

## 2021-05-25 PROCEDURE — 93000 ELECTROCARDIOGRAM COMPLETE: CPT | Performed by: INTERNAL MEDICINE

## 2021-05-25 PROCEDURE — 99215 OFFICE O/P EST HI 40 MIN: CPT | Performed by: INTERNAL MEDICINE

## 2021-05-25 ASSESSMENT — PATIENT HEALTH QUESTIONNAIRE - PHQ9
1. LITTLE INTEREST OR PLEASURE IN DOING THINGS: NOT AT ALL
CLINICAL INTERPRETATION OF PHQ9 SCORE: NO FURTHER SCREENING NEEDED
2. FEELING DOWN, DEPRESSED OR HOPELESS: NOT AT ALL
CLINICAL INTERPRETATION OF PHQ2 SCORE: NO FURTHER SCREENING NEEDED
SUM OF ALL RESPONSES TO PHQ9 QUESTIONS 1 AND 2: 0
SUM OF ALL RESPONSES TO PHQ9 QUESTIONS 1 AND 2: 0

## 2021-06-04 ENCOUNTER — TELEPHONE (OUTPATIENT)
Dept: CARDIOLOGY | Age: 86
End: 2021-06-04

## 2021-06-04 ENCOUNTER — ANTI-COAG (OUTPATIENT)
Dept: CARDIOLOGY | Age: 86
End: 2021-06-04

## 2021-06-04 DIAGNOSIS — I26.99 PE (PULMONARY THROMBOEMBOLISM) (CMD): ICD-10-CM

## 2021-06-04 DIAGNOSIS — Z79.01 LONG TERM (CURRENT) USE OF ANTICOAGULANTS: ICD-10-CM

## 2021-06-04 RX ORDER — AMOXICILLIN 500 MG/1
TABLET, FILM COATED ORAL
Qty: 4 TABLET | Refills: 3 | Status: SHIPPED | OUTPATIENT
Start: 2021-06-04

## 2022-01-10 ENCOUNTER — APPOINTMENT (OUTPATIENT)
Dept: GENERAL RADIOLOGY | Age: 87
End: 2022-01-10
Attending: PHYSICIAN ASSISTANT
Payer: MEDICARE

## 2022-01-10 ENCOUNTER — HOSPITAL ENCOUNTER (OUTPATIENT)
Age: 87
Discharge: HOME | End: 2022-01-10
Payer: MEDICARE

## 2022-01-10 VITALS
RESPIRATION RATE: 16 BRPM | OXYGEN SATURATION: 99 % | TEMPERATURE: 98 F | HEIGHT: 67 IN | DIASTOLIC BLOOD PRESSURE: 84 MMHG | SYSTOLIC BLOOD PRESSURE: 107 MMHG | HEART RATE: 80 BPM | WEIGHT: 157 LBS | BODY MASS INDEX: 24.64 KG/M2
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
## 2022-01-10 DIAGNOSIS — S51.011A ELBOW LACERATION, RIGHT, INITIAL ENCOUNTER: ICD-10-CM

## 2022-01-10 PROCEDURE — 12002 RPR S/N/AX/GEN/TRNK2.6-7.5CM: CPT | Performed by: PHYSICIAN ASSISTANT

## 2022-01-10 PROCEDURE — 90471 IMMUNIZATION ADMIN: CPT | Performed by: PHYSICIAN ASSISTANT

## 2022-01-10 PROCEDURE — 73080 X-RAY EXAM OF ELBOW: CPT | Performed by: PHYSICIAN ASSISTANT

## 2022-01-10 PROCEDURE — 99204 OFFICE O/P NEW MOD 45 MIN: CPT | Performed by: PHYSICIAN ASSISTANT

## 2022-01-10 PROCEDURE — 90715 TDAP VACCINE 7 YRS/> IM: CPT | Performed by: PHYSICIAN ASSISTANT

## 2022-01-10 RX ORDER — CEPHALEXIN 500 MG/1
500 CAPSULE ORAL 4 TIMES DAILY
Qty: 40 CAPSULE | Refills: 0 | Status: SHIPPED | OUTPATIENT
Start: 2022-01-10 | End: 2022-01-20

## 2022-01-10 NOTE — ED PROVIDER NOTES
Patient Seen in: Immediate [address]      History   Patient presents with:  Fall    Stated Complaint: Right Elbow Laceration after fall    Subjective:   HPI    31-year-old male here with complaint of pain swelling laceration to his right elbo • VALVE REPLACEMENT                  Social History    Tobacco Use      Smoking status: Never Smoker      Smokeless tobacco: Never Used    Vaping Use      Vaping Use: Never used    Alcohol use:  Yes      Alcohol/week: 2.0 standard drinks      Types: 1 Gla General: Skin is warm. Capillary Refill: Capillary refill takes less than 2 seconds. Neurological:      General: No focal deficit present. Mental Status: He is alert and oriented to person, place, and time.    Psychiatric:         Mood and Affec mild chondrocalcinosis seen along the radiocapitellar joint space of the elbow, suspicious for potential CPPD arthropathy.    Dictated by (CST): Brittney Payton DO on 1/10/2022 at 5:41 PM     Finalized by (CST): Brittney Payton DO on 1/10/2022 at 5:43 PM     I the patient's satisfaction prior to discharge today. Previous conversations with PCP and charts were reviewed.                                  Disposition and Plan     Clinical Impression:  Fall, initial encounter  (primary encounter diagnosis)  Elbow lac

## 2022-01-10 NOTE — ED INITIAL ASSESSMENT (HPI)
Pt c/o fall right elbow injury today. Pt states he slipped and fell on the ice today. Pt c/o laceration to his right elbow, pt also c/o hitting his head. Pt denies loc.

## 2022-08-01 ENCOUNTER — HOSPITAL ENCOUNTER (OUTPATIENT)
Dept: CV DIAGNOSTICS | Facility: HOSPITAL | Age: 87
Discharge: HOME | End: 2022-08-01
Attending: INTERNAL MEDICINE
Payer: MEDICARE

## 2022-08-01 DIAGNOSIS — I34.1 MITRAL VALVE PROLAPSE, NONRHEUMATIC: ICD-10-CM

## 2022-08-01 PROCEDURE — 93306 TTE W/DOPPLER COMPLETE: CPT | Performed by: INTERNAL MEDICINE

## (undated) DEVICE — 3M™ TEGADERM™ TRANSPARENT FILM DRESSING, 1626W, 4 IN X 4-3/4 IN (10 CM X 12 CM), 50 EACH/CARTON, 4 CARTON/CASE: Brand: 3M™ TEGADERM™

## (undated) DEVICE — 3M(TM) TEGADERM(TM) TRANSPARENT FILM DRESSING FRAME STYLE 1628: Brand: 3M™ TEGADERM™

## (undated) DEVICE — OPEN HEART: Brand: MEDLINE INDUSTRIES, INC.

## (undated) DEVICE — COR-KNOT MINI® COMBO KITBASE PACKAGE TYPE - KITEACH STERILE PACKAGE KIT CONTAINS (2) SINGLE PATIENT USE COR-KNOT MINI® DEVICES AND (12) COR-KNOT® QUICK LOADS®.: Brand: COR-KNOT MINI®

## (undated) DEVICE — SOL  .9 1000ML BTL

## (undated) DEVICE — STERILE POLYISOPRENE POWDER-FREE SURGICAL GLOVES: Brand: PROTEXIS

## (undated) DEVICE — CELL SAVER BAG 600ML 4R2023

## (undated) DEVICE — SUTURE PROLENE 5-0 V-5

## (undated) DEVICE — 1840 FOAM BLOCK NEEDLE COUNTER: Brand: DEVON

## (undated) DEVICE — BLOWER CO2 MEDTRONIC/DLP 22150

## (undated) DEVICE — GLOVE SURG TRIUMPH SZ 8

## (undated) DEVICE — SUTURE PROLENE 3-0 SH

## (undated) DEVICE — ADULT, RADIOTRANSPARENT ELEMENT, COMPATIBLE W/ GRAY FLAT CONNECTOR: Brand: DEFIBRILLATION ELECTRODES

## (undated) DEVICE — BLADE SW 32X6MM  STRNM

## (undated) DEVICE — Device

## (undated) DEVICE — GAUZE SPONGES,12 PLY: Brand: CURITY

## (undated) DEVICE — TIBURON DRAPE TOWELS: Brand: CONVERTORS

## (undated) DEVICE — SUTURE SILK 0 FSL

## (undated) DEVICE — FIXED CORE WIRE GUIDE SAFE-T-J, CURVED: Brand: COOK

## (undated) DEVICE — CELL SAVER RESERVOIR BRAT

## (undated) DEVICE — SUTURE POLYDEK 3-0 69-738

## (undated) DEVICE — SUTURE PROLENE 4-0 SH

## (undated) DEVICE — SOL LACT RINGERS 1000ML

## (undated) DEVICE — CELL SAVER 5/5+ BOWL KIT-225ML: Brand: HAEMONETICS CELL SAVER 5/5+ SYSTEMS

## (undated) DEVICE — INTENT TO BE USED WITH SUTURE MATERIAL FOR TISSUE CLOSURE: Brand: RICHARD-ALLAN® NEEDLE 1/2 CIRCLE TAPER

## (undated) DEVICE — SUTURE PROLENE 4-0 V-5

## (undated) DEVICE — TRANSPOSAL ULTRAFLEX DUO/QUAD ULTRA CART MANIFOLD

## (undated) DEVICE — CATH RED RUBBER 20FR

## (undated) DEVICE — CLAMP INSERT: Brand: STEALTH® CLAMP INSERT

## (undated) DEVICE — BARD® PTFE FELT PLEDGETS, (OVAL), 4.8 MM X 6 MM: Brand: BARD® PTFE FELT PLEDGETS

## (undated) DEVICE — TAPE UMBILICAL U11T

## (undated) NOTE — LETTER
[facility] [address], [address]  Consent for Procedure/Sedation    Date: 8/9/2020    Time: 1700      1. I authorize the performance upon Genesis Mack the following: Transesophageal Echocardiogram     2.  I authorize Dr. Anupam Odell 6/4/1933       Medical Record #: [id number]

## (undated) NOTE — LETTER
[address] [facility] FOR BLOOD OR BLOOD COMPONENTS      In the course of your treatment, it may become necessary to administer a transfusion of blood or blood components.  This form provides basic information concerning this proc explain the alternatives to you if it has not already been done. I,Jr Silva, have read/had read to me the above. I understand the matters bearing on the decision whether or not to authorize a transfusion of blood or blood components.  I have no ques

## (undated) NOTE — LETTER
Jodi Silver [address] [facility], [address] [place] [address]  Authorization for Surgical Operation and Procedure     Date:08/13/2020                                                                                                         Time:13:09 PM  1. potential risks that can occur: fever and allergic reactions, hemolytic reactions, transmission of diseases such as Hepatitis, AIDS and Cytomegalovirus (CMV) and fluid overload.   In the event that I wish to have an autologous transfusion of my own blood, o attending physician will determine when the applicable recovery period ends for purposes of reinstating the DNAR order.   10. Patients having a sterilization procedure: I understand that if the procedure is successful the results will be permanent and it wi a. Allow the anesthesiologist (anesthesia doctor) to give me medicine and do additional procedures as necessary.  Some examples are: Starting or using an “IV” to give me medicine, fluids or blood during my procedure, and having a breathing tube placed to he 7. Regional Anesthesia (“spinal”, “epidural”, & “nerve blocks”): I understand that rare but potential complications include headache, bleeding, infection, seizure, irregular heart rhythms, and nerve injury.     I can change my mind about having anesthesia

## (undated) NOTE — LETTER
Natalia Pina M.D., F.A.C.S. Leroy He M.D., F.A.C.S. Carmelita White M.D., Randy Bethea. YAIR Schulte M.D., F.A.C.SMax Mendieta. Chelita Maldonado M.D., F.A.C.SGUIDO Thompson M. Joyce Jacobus A.D. Jeannetta Rushing, M.D., GASPER chance to have all of your questions and concerns answered. If there are any issues which have not been adequately addressed, we ask you to bring them forward so that we can thoroughly address them.     A patient who is fully informed and understands their treatment, among other options and the risks and benefits of the different treatment options:    Yes _____ No _____    A [facility] surgeon as explained to me that if I should so desire, he/she is willing to explain my case and the surgical and non-surgical optio

## (undated) NOTE — ED AVS SNAPSHOT
Kandy An   MRN: [id number]    Department:  [facility] Emergency Department   Date of Visit:  10/15/2019           Disclosure     Insurance plans vary and the physician(s) referred by the ER may not be covered by your plan.  Please contact yo tell this physician (or your personal doctor if your instructions are to return to your personal doctor) about any new or lasting problems. The primary care or specialist physician will see patients referred from the [facility] Emergency Department.  Shelton Lombard